# Patient Record
Sex: FEMALE | Race: WHITE
[De-identification: names, ages, dates, MRNs, and addresses within clinical notes are randomized per-mention and may not be internally consistent; named-entity substitution may affect disease eponyms.]

---

## 2018-02-10 ENCOUNTER — HOSPITAL ENCOUNTER (INPATIENT)
Dept: HOSPITAL 36 - ER | Age: 73
LOS: 4 days | Discharge: HOME | DRG: 640 | End: 2018-02-14
Attending: FAMILY MEDICINE | Admitting: FAMILY MEDICINE
Payer: MEDICARE

## 2018-02-10 DIAGNOSIS — E86.0: ICD-10-CM

## 2018-02-10 DIAGNOSIS — F31.9: ICD-10-CM

## 2018-02-10 DIAGNOSIS — Z91.041: ICD-10-CM

## 2018-02-10 DIAGNOSIS — Z83.79: ICD-10-CM

## 2018-02-10 DIAGNOSIS — G20: ICD-10-CM

## 2018-02-10 DIAGNOSIS — E83.42: ICD-10-CM

## 2018-02-10 DIAGNOSIS — R53.2: ICD-10-CM

## 2018-02-10 DIAGNOSIS — I12.9: ICD-10-CM

## 2018-02-10 DIAGNOSIS — F02.80: ICD-10-CM

## 2018-02-10 DIAGNOSIS — G25.0: ICD-10-CM

## 2018-02-10 DIAGNOSIS — Z88.7: ICD-10-CM

## 2018-02-10 DIAGNOSIS — K21.9: ICD-10-CM

## 2018-02-10 DIAGNOSIS — N18.9: ICD-10-CM

## 2018-02-10 DIAGNOSIS — G93.41: ICD-10-CM

## 2018-02-10 DIAGNOSIS — Z88.6: ICD-10-CM

## 2018-02-10 DIAGNOSIS — G62.9: ICD-10-CM

## 2018-02-10 DIAGNOSIS — E46: ICD-10-CM

## 2018-02-10 DIAGNOSIS — I25.10: ICD-10-CM

## 2018-02-10 DIAGNOSIS — Z87.891: ICD-10-CM

## 2018-02-10 DIAGNOSIS — E78.5: ICD-10-CM

## 2018-02-10 DIAGNOSIS — E87.1: Primary | ICD-10-CM

## 2018-02-10 DIAGNOSIS — J44.9: ICD-10-CM

## 2018-02-10 DIAGNOSIS — F41.9: ICD-10-CM

## 2018-02-10 DIAGNOSIS — R13.10: ICD-10-CM

## 2018-02-10 DIAGNOSIS — E87.6: ICD-10-CM

## 2018-02-10 LAB
ALBUMIN SERPL-MCNC: 4.2 GM/DL (ref 3.7–5.3)
ALBUMIN/GLOB SERPL: 1.4 {RATIO} (ref 1–1.8)
ALP SERPL-CCNC: 57 U/L (ref 34–104)
ALT SERPL-CCNC: 11 U/L (ref 7–52)
ANION GAP SERPL CALC-SCNC: 9.7 MMOL/L (ref 7–16)
APPEARANCE UR: CLEAR
AST SERPL-CCNC: 24 U/L (ref 13–39)
BACTERIA #/AREA URNS HPF: (no result) /HPF
BASOPHILS # BLD AUTO: 0.1 TH/CUMM (ref 0–0.2)
BASOPHILS NFR BLD AUTO: 1.1 % (ref 0–2)
BILIRUB SERPL-MCNC: 0.5 MG/DL (ref 0.3–1)
BILIRUB UR-MCNC: NEGATIVE MG/DL
BUN SERPL-MCNC: 27 MG/DL (ref 7–25)
CALCIUM SERPL-MCNC: 10.4 MG/DL (ref 8.6–10.3)
CHLORIDE SERPL-SCNC: 99 MEQ/L (ref 98–107)
CO2 SERPL-SCNC: 31.2 MEQ/L (ref 21–31)
COLOR UR: YELLOW
CREAT SERPL-MCNC: 0.8 MG/DL (ref 0.6–1.2)
EOSINOPHIL # BLD AUTO: 0.3 TH/CMM (ref 0.1–0.4)
EOSINOPHIL NFR BLD AUTO: 4.3 % (ref 0–5)
EPI CELLS URNS QL MICRO: (no result) /LPF
ERYTHROCYTE [DISTWIDTH] IN BLOOD BY AUTOMATED COUNT: 12.8 % (ref 11.5–20)
GLOBULIN SER-MCNC: 3 GM/DL
GLUCOSE SERPL-MCNC: 105 MG/DL (ref 70–105)
GLUCOSE UR STRIP-MCNC: 100 MG/DL
HCT VFR BLD CALC: 41.3 % (ref 41–60)
HGB BLD-MCNC: 13.8 GM/DL (ref 12–16)
KETONES UR STRIP-MCNC: NEGATIVE MG/DL
LEUKOCYTE ESTERASE UR-ACNC: NEGATIVE
LYMPHOCYTE AB SER FC-ACNC: 1 TH/CMM (ref 1.5–3)
LYMPHOCYTES NFR BLD AUTO: 13.2 % (ref 20–50)
MCH RBC QN AUTO: 30.5 PG (ref 27–31)
MCHC RBC AUTO-ENTMCNC: 33.4 PG (ref 28–36)
MCV RBC AUTO: 91.4 FL (ref 81–100)
MICRO URNS: YES
MONOCYTES # BLD AUTO: 0.6 TH/CMM (ref 0.3–1)
MONOCYTES NFR BLD AUTO: 7.7 % (ref 2–10)
NEUTROPHILS # BLD: 5.5 TH/CMM (ref 1.8–8)
NEUTROPHILS NFR BLD AUTO: 73.7 % (ref 40–80)
NITRITE UR QL STRIP: NEGATIVE
PH UR STRIP: 7 [PH] (ref 4.6–8)
PLATELET # BLD: 350 TH/CMM (ref 150–400)
PMV BLD AUTO: 8 FL
POTASSIUM SERPL-SCNC: 4.9 MEQ/L (ref 3.5–5.1)
PROT UR STRIP-MCNC: NEGATIVE MG/DL
RBC # BLD AUTO: 4.52 MIL/CMM (ref 3.8–5.2)
RBC # UR STRIP: NEGATIVE /UL
RBC #/AREA URNS HPF: (no result) /HPF (ref 0–5)
SODIUM SERPL-SCNC: 135 MEQ/L (ref 136–145)
SP GR UR STRIP: 1.01 (ref 1–1.03)
URINALYSIS COMPLETE PNL UR: (no result)
UROBILINOGEN UR STRIP-ACNC: 0.2 E.U./DL (ref 0.2–1)
WBC # BLD AUTO: 7.5 TH/CMM (ref 4.8–10.8)
WBC #/AREA URNS HPF: (no result) /HPF (ref 0–5)

## 2018-02-10 PROCEDURE — X3401: HCPCS

## 2018-02-10 PROCEDURE — Z7610: HCPCS

## 2018-02-10 RX ADMIN — Medication SCH TAB: at 21:13

## 2018-02-10 RX ADMIN — DEXTROSE AND SODIUM CHLORIDE SCH MLS/HR: 5; .9 INJECTION, SOLUTION INTRAVENOUS at 21:15

## 2018-02-10 NOTE — ED PHYSICIAN CHART
ED Chief Complaint/HPI





- Patient Information


Date Seen:: 02/10/18


Time Seen:: 14:15


Chief Complaint:: GENERALIZED WEAKNESS and NOT EATING X 4 WKS


History of Present Illness:: 





HPI was provided by the patient's sister.  Patient has had difficulty walking 

over the past 4 years.  3 weeks ago was noted that she was unable to transfer 

from her wheelchair to the toilet and in and out of bed without assistance.  

The patient has difficulty swallowing and has not been eating for the past 3-4 

weeks.  Occasionally she chokes and has had infrequent episodes of vomiting.  

The patient denies any pain, nausea, difficulty breathing, fever, chills, or 

diaphoresis.  She has sustained a 10 pound weight loss over the past month.  

The patient's has Parkinson's disease and her psychiatrist was considering 

bringing her into the hospital for evaluation of her medications.  The patient'

s sister has noticed over the past month that her tongue protrudes from her 

mouth in a way that it has never done so before.


Allergies:: 


 Allergies











Allergy/AdvReac Type Severity Reaction Status Date / Time


 


blue dye Allergy   Verified 02/10/18 14:06


 


tramadol Allergy   Verified 02/10/18 14:06


 


IV contrast Allergy   Uncoded 02/10/18 14:06











Vitals:: 


 Vital Signs - 8 hr











  02/10/18





  14:06


 


Temp 98.0 F


 


HR 77


 


RR 18


 


/92


 


O2 Sat % 96











Historian:: Family Member





ED Review of Systems





- Review of Systems


General/Constitutional: No fever, No chills, Weakness, No diaphoresis, No edema

, Loss of appetite


Skin: No skin lesions, No rash, Bruising


Head: No headache, No light-headedness


Eyes: No loss of vision, No diplopia


ENT: No earache, No sore throat, No tinnitus


Neck: No neck pain, No swelling, No thyromegaly, Stiffness, No mass noted


Cardio Vascular: No chest pain, No orthopnea, No edema


Pulmonary: No SOB, No cough, No sputum, No wheezing


GI: No nausea (occasional vomiting accompanying choking episodes.), No diarrhea

, No hematochezia, No constipation, No hematemesis, Other (difficulty swallowing

)


G/U: No dysuria, No frequency, No hematuria


Ob/Gyn: No abnormal vaginal bleed


Musculoskeletal: No bone or joint pain, Back pain (back pain is chronic.)


Endocrine: No polyuria, No polydipsia


Psychiatric: Other (patient has a diagnosis of dementia.  She also has Parkinson

's disease.)


Hematopoietic: Bruising


Allergic/Immuno: No urticaria, No angioedema


Neurological: No syncope, Weakness, No paresthesia, No headache, No seizure, No 

dizziness, Other (patient has a prominent tremor in the left upper extremity.  

 is bilaterally weak.)





ED Past Medical History





- Past Medical History


Past Medical History: Dementia, Other (Parkinson's disease.  The patient smoked 

in the past but is currently not using any tobacco products.)


Social History: No Drug Use, Care Facility, Other (the patient has in the past 

had addiction to prescription opiates.)


Surgical History: 





Family Medical History





- Family Member


  ** Mother


History Unknown: Yes


Ethnicity: Non-


Living Status: 


Hx Family Cancer:  (Unknown)


Hx Family Congestive Heart Failure:  (Unknown)


Hx Family Hypertension:  (Unknown)


Hx Family Stroke:  (Unknown)


Hx Family Diabetes:  (Unknown)


Hx Family Seizures:  (Unknown)


Hx Family Dementia:  (Unknown)


Hx Family AIDS:  (Unknown)


Hx Family COPD:  (Unknown)


Hx Family Hepatitis:  (Unknown)


Hx Family Tuberculosis:  (Unknown)


Other Medical History: Diverticulosis





ED Physical Exam





- Physical Examination


General/Constitutional: Well-developed, well-nourished, Non-toxic appearing


Other Gen/Cons comments:: 





The patient is awake but not alert.  She has a very soft voice she is oriented 

3 to her name, the fact that she is in a hospital and she is able to identify 

the year as 2018.  Patient has a noticeable tremor in the left upper extremity 

and extremely stiff neck with a frozen dinner in the AP flexion position.  The 

patient no longer smokes or uses opiate medications.  She does not drink 

alcohol.  She was  prior to her  expiring.  She has 2 living 

children one of which suffers from MS and mental cognition.


Head: Atraumatic


Eyes: PERRL, EOMI


Other Eyes comments:: 





Patient has a discharge from her right eye which the sister has noticed is new 

over the past week.  No facial asymmetry.  Tongue protrudes in the midline.


Skin: Nl inspection, No rash, Well hydrated, No lymphadenopathy


Other Skin comments:: 





Patient has superficial ecchymotic discoloration in both upper extremities.


ENMT: External ears, nose nl


Other ENMT comments:: 





Unable to evaluate the posterior pharynx due to a large protruding tongue.  

Adequate oral hydration.


Neck: Nontender, No bruit, No mass, No stridor


Other Neck comments:: 





The range of motion is markedly limited in the neck due to stiffness.


Respiratory: Nl effort/Exclusion, Clear to Auscultation, No Wheeze/Rhonchi/Rales


Cardio Vascular: RRR, No murmur, gallop, rubs, NL S1 S2


Other Cardio Vascular comments:: 





Adequate pulses in all 4 extremities.


: No CVA tenderness, No discharge


Extremities: No tenderness or effusion, Full ROM, No edema


Other Extremities comments:: 





Diffuse weakness in both upper and lower extremities.


Misc: No paraspinal tenderness


Other Misc comments:: 





No spinal tenderness to palpation.





ED Labs/Radiology/EKG Results





- Lab Results


Results: 





2 views of the AP chest x-ray were obtained due to significant rotation 

secondary to Parkinson's disease.  There was no cardiomegaly and no CHF.  No 

areas of pulmonary consolidation or infiltrate.  No pneumothorax.  Impression: 

No acute cardiopulmonary findings.  This x-ray was read by me prior to 

reporting from radiology.


 Laboratory Tests











  02/10/18





  15:13


 


WBC  7.5


 


RBC  4.52


 


Hgb  13.8


 


Hct  41.3


 


MCV  91.4


 


MCH  30.5


 


MCHC Differential  33.4


 


RDW  12.8


 


Plt Count  350


 


MPV  8.0


 


Neutrophils %  73.7


 


Lymphocytes %  13.2 L


 


Monocytes %  7.7


 


Eosinophils %  4.3


 


Basophils %  1.1








Laboratory evaluation: ABC is unremarkable and that there is no leukocytosis or 

anemia.  Platelet function is within the normal range.


 Laboratory Tests











  02/10/18 02/10/18





  15:13 15:13


 


WBC  7.5 


 


RBC  4.52 


 


Hgb  13.8 


 


Hct  41.3 


 


MCV  91.4 


 


MCH  30.5 


 


MCHC Differential  33.4 


 


RDW  12.8 


 


Plt Count  350 


 


MPV  8.0 


 


Neutrophils %  73.7 


 


Lymphocytes %  13.2 L 


 


Monocytes %  7.7 


 


Eosinophils %  4.3 


 


Basophils %  1.1 


 


Sodium   135 L


 


Potassium   4.9


 


Chloride   99


 


Carbon Dioxide   31.2 H


 


Anion Gap   9.7


 


BUN   27 H


 


Creatinine   0.8


 


Est GFR ( Amer)   > 60.0


 


Est GFR (Non-Af Amer)   > 60.0


 


BUN/Creatinine Ratio   33.8


 


Glucose   105


 


Calcium   10.4 H


 


Total Bilirubin   0.5


 


AST   24


 


ALT   11


 


Alkaline Phosphatase   57


 


Total Protein   7.2


 


Albumin   4.2


 


Globulin   3.0


 


Albumin/Globulin Ratio   1.4








The CBC was unremarkable with no leukocytosis, anemia or thrombocytopenia.  

Metabolic studies showed a mild hyponatremia with a sodium of 135 and a high 

bicarbonate of 31.2.  Serum creatinine was within normal parameters and the BUN 

was mildly elevated at 27.  Her serum glucose was 105 which is minimally 

elevated.  Liver function tests including the AST, the ALT and the alkaline 

phosphatase were all within normal parameters.  Urinalysis is still pending at 

this time.





- EKG Interpretations


EKG Time:: 14:39


Rate & Rhythm: the patient is in normal sinus rhythm at a rate of 70 with no 

ectopy.


Axis: the QRS axis is normal.


Intervals: the NC interval is within normal parameters.  The QRS duration is 

normal.  


Comments:: 





There is early transition of the QRS in the precordial leads.  No ST segment 

elevation or depression.  Impression no acute ischemic findings.  Borderline 

EKG.





ED Assessment





- Assessment


General Assessment: 





CASE SUMMARY:  THE PT WAS BIB HER SISTER WHO PROVIDED THE HPI.  SHE HAD BEEN 

HAVING PROBLEMS SWALLOWING AND HAD LOST 10 LBS IN THE PAST WEEK.  SHE HAS HAD 

PARKINSON'S DISEASE AND HAD BECOME BED RIDDEN OR CONFINED TO HER WILL CHAIR 

OVER THE PAST 3 YEARS.  SHE HAS BEEN UNABLE TO TRANSFER FROM HER WHEEL CHAIR TO 

HER BED OR TOIETWITHOUT ASSISTANCE.  SHE HAS ALSO BEEN GETTING SLOWLY MORE 

CONFUSED AND GIVEN A DIAGNOSIS OF DEMENTIA.  SHE HAS HAD PARKINSON'S DISEASE 

FOR THE PAST 6 YEARS WITH STIFFNESS IN HER NECK AND BOTH LOWER EXTREMITIES WITH 

WEAKNESS IN BOTH UPPER AND LOWER EXTREMITIES.  PT DENIES ANY PAIN CURRENTLY BUT 

IN THE PAST WAS HAVING PROBLEMS WITH OXYCONTIN THAT SHE TOOK FOR CHRONIC BACK 

PAIN.  HER BLOOD  WORK WAS UNREMARKABLE WITH NORMAL HEMOGLOBIN LEVEL, NORMAL 

WHITE COUNT, AND NORMAL PLATELET COUNT. HER ELECTROLYTES WERE WITHIN NORMAL 

PARAMETERS AND THERE WAS NO EVIDENCE OF RENAL IMPAIRMENT OR HEPATIC IMPAIRMENT. 

CHEST X-RAY SHOWED NO EVIDENCE FOR ACUTE PNEUMONIA OR CHF. THE URINALYSIS WAS 

NEGATIVE FOR UTI. DURING THE COURSE OF THE ER OBSERVATION, THE PATIENT'S SISTER 

RECOMMENDED THAT WE TRY GIVING HER SOME ATIVAN TO HELP WITH HER TREMOR. 

SURPRISINGLY, AFTER RECEIVING THE  ATIVAN THE PATIENT BECAME MUCH MORE 

EXPRESSIVE AND AWAKE AND LOOKED 100% BETTER. THE CASE WAS DISCUSSED WITH DR. HAYNES  AND THE DECISION WAS MADE TO ADMIT HER FOR FURTHER STUDY OF 

ESOPHAGEAL MOVEMENT AND IMPROVEMENT IN HER NUTRITION. ADMITTED INTO IMPROVED 

CONDITION.





MDM DDX FOR INCREASED WEAKNESS:  NOT  ANEMIA BASED ON THE CBC RESULTS.  NOT 

ELECTROLYTE IMBALANCE BASED ON LABORATORY STUDIES.  NOT  SEPSIS BASED ON THE 

PATIENT'S VITAL SIGNS, LABORATORY STUDIES AND PROTRACTED ONSET OF SYMPTOMS.  NOT

  MYOCARDIAL ISCHEMIA BASED ON THE PATIENT'S HISTORY AND EKG.  NOT   CVA BASED 

ON THE PATIENT'S HISTORY AND LACK OF FOCAL NEUROLOGIC DEFICIT.





ED Septic Shock





- .


Is Septic Shock (SBP<90, OR Lactate>4 mmol\L) present?: No





- <6hrs of presentation:


Vital Signs: 


 Vital Signs - 8 hr











  02/10/18





  14:06


 


Temp 98.0 F


 


HR 77


 


RR 18


 


/92


 


O2 Sat % 96














ED Reassessment (Disposition)





- Reassessment


Reassessment Condition:: Improved





- Diagnosis


Diagnosis:: 





 PARKINSON'S DISEASE, DEMENTIA, HYPERTENSION, AND MILD  MILD  HYPONATREMIA.





- Aftercare/Follow up Instructions


Aftercare/Follow-Up Instructions:: Counseled pt regarding lab results/diagnosis 

& need follow up, Counseled pt & family regarding lab results/diagnosis & need 

follow up





- Patient Disposition


Discharge/Transfer:: Acute Care w/in this hosp


Accepting Physician:: DR. HAYNES





ED Discharge Plan





- Patient Disposition


Admit/Discharge/Transfer: Acute Care w/in this hosp

## 2018-02-11 LAB
ALBUMIN SERPL-MCNC: 3.8 GM/DL (ref 3.7–5.3)
ALBUMIN/GLOB SERPL: 1.5 {RATIO} (ref 1–1.8)
ALP SERPL-CCNC: 49 U/L (ref 34–104)
ALT SERPL-CCNC: 6 U/L (ref 7–52)
ANION GAP SERPL CALC-SCNC: 9.7 MMOL/L (ref 7–16)
AST SERPL-CCNC: 21 U/L (ref 13–39)
BASOPHILS # BLD AUTO: 0 TH/CUMM (ref 0–0.2)
BASOPHILS NFR BLD AUTO: 0 % (ref 0–2)
BILIRUB SERPL-MCNC: 0.6 MG/DL (ref 0.3–1)
BUN SERPL-MCNC: 18 MG/DL (ref 7–25)
CALCIUM SERPL-MCNC: 9.7 MG/DL (ref 8.6–10.3)
CHLORIDE SERPL-SCNC: 102 MEQ/L (ref 98–107)
CO2 SERPL-SCNC: 25.9 MEQ/L (ref 21–31)
CREAT SERPL-MCNC: 0.6 MG/DL (ref 0.6–1.2)
EOSINOPHIL # BLD AUTO: 0.7 TH/CMM (ref 0.1–0.4)
EOSINOPHIL NFR BLD AUTO: 10.3 % (ref 0–5)
ERYTHROCYTE [DISTWIDTH] IN BLOOD BY AUTOMATED COUNT: 12.3 % (ref 11.5–20)
GLOBULIN SER-MCNC: 2.5 GM/DL
GLUCOSE SERPL-MCNC: 97 MG/DL (ref 70–105)
HCT VFR BLD CALC: 37.9 % (ref 41–60)
HGB BLD-MCNC: 13 GM/DL (ref 12–16)
LYMPHOCYTE AB SER FC-ACNC: 1.2 TH/CMM (ref 1.5–3)
LYMPHOCYTES NFR BLD AUTO: 18.3 % (ref 20–50)
MCH RBC QN AUTO: 31.1 PG (ref 27–31)
MCHC RBC AUTO-ENTMCNC: 34.3 PG (ref 28–36)
MCV RBC AUTO: 90.9 FL (ref 81–100)
MONOCYTES # BLD AUTO: 0.7 TH/CMM (ref 0.3–1)
MONOCYTES NFR BLD AUTO: 10.6 % (ref 2–10)
NEUTROPHILS # BLD: 3.9 TH/CMM (ref 1.8–8)
NEUTROPHILS NFR BLD AUTO: 60.8 % (ref 40–80)
PLATELET # BLD: 285 TH/CMM (ref 150–400)
PMV BLD AUTO: 8.3 FL
POTASSIUM SERPL-SCNC: 3.6 MEQ/L (ref 3.5–5.1)
RBC # BLD AUTO: 4.17 MIL/CMM (ref 3.8–5.2)
SODIUM SERPL-SCNC: 134 MEQ/L (ref 136–145)
WBC # BLD AUTO: 6.5 TH/CMM (ref 4.8–10.8)

## 2018-02-11 RX ADMIN — DEXTROSE AND SODIUM CHLORIDE SCH MLS/HR: 5; .9 INJECTION, SOLUTION INTRAVENOUS at 16:05

## 2018-02-11 RX ADMIN — Medication SCH TAB: at 10:45

## 2018-02-11 RX ADMIN — NIFEDIPINE SCH MG: 30 TABLET, FILM COATED, EXTENDED RELEASE ORAL at 10:45

## 2018-02-11 NOTE — DIAGNOSTIC IMAGING REPORT
Portable chest x-ray



HISTORY: Weight loss



The heart appears to be enlarged.  There is a faint linear density

within the right perihilar region.  The finding may be associated with

scarring or subsegmental atelectasis.  No other focal processes.  No

hilar or mediastinal abnormalities.



IMPRESSION:

1.  Faint linear density within the right midlung that may be related to

scarring or subsegmental atelectasis

2.  Cardiomegaly

## 2018-02-11 NOTE — HISTORY AND PHYSICAL
History of Present Illness





- HPI


Chief Complaint: 





generalized weakness and inability to eat x 4 weeks.


HPI: 





72 year old female who presents to [facility] for difficulty 

swallowing for the past 4 weeks and 10 lbs weight loss noted at the nursing 

facility. Patient has a previous history of Parkinson's disease and over the 

course of 4 years has progressed to a pont where she is unable to transfer from 

her wheelchair to the toilet and has needed assistance in and out of bed. 

Recently patient has been noted to have difficulty swallowing and losing 

weight. Patient presented with dehydration and hyponatremia. Patient has been 

noted to have increased tremors to the left upper ext.





Initial labs





WBC 7.5 H/H 13.8/41.3 platelets 350


Na 135 K 4.9 Bun 27 cr 0.8 glu 105


AST 24 ALT 11 Alk 57





Vital Signs: 





 Last Vital Signs











Temp  97.6 F   18 04:00


 


Pulse  62   18 04:00


 


Resp  18   18 04:00


 


BP  140/76   18 04:00


 


Pulse Ox  98   18 04:00














Past Medical History


Cardiovascular: Report: CAD, Hyperlipidemia


Pulmonary: Report: COPD


CNS: Report: Dementia, Other (Parkinson's disease, unstable gait, polyneuropathy

, essential tremors)


GI: Report: GERD, Other (dysphagia)


Psych: Report: Anxiety


Musculoskeletal: Report: No Pertinent Hx


Rheumatologic: Report: No pertinent Hx


Infectious Disease: Report: No Pertinent Hx


Renal/: Report: Chronic Renal Insuff


Endocrine: Report: No Pertinent Hx


Dermatology: Report: No Pertinent Hx





- Past Surgical History


Past Surgical History: No pertinent Hx





Family Medical History





- Family Member


  ** Mother


History Unknown: Yes


Ethnicity: Non-


Living Status: 


Hx Family Cancer:  (Unknown)


Hx Family Congestive Heart Failure:  (Unknown)


Hx Family Hypertension:  (Unknown)


Hx Family Stroke:  (Unknown)


Hx Family Diabetes:  (Unknown)


Hx Family Seizures:  (Unknown)


Hx Family Dementia:  (Unknown)


Hx Family AIDS:  (Unknown)


Hx Family COPD:  (Unknown)


Hx Family Hepatitis:  (Unknown)


Hx Family Tuberculosis:  (Unknown)


Other Medical History: Diverticulosis





Social History


Smoke: No


Alcohol: None


Drugs: None


Lives: Alone





- Medications


Home Medications: 


Home Medication











 Medication  Instructions  Recorded  Type


 


Al Hyd/Mg Hyd/Simethicone [Maalox] 30 ml PO Q4HR PRN 12/08/15 History


 


Aspirin EC [Ecotrin] 81 mg PO DAILY 12/08/15 History


 


Atenolol [Tenormin*] 25 mg PO DAILY 12/08/15 History


 


Docusate Sodium [Colace] 200 mg PO BID 12/08/15 History


 


Enalapril Maleate [Vasotec*] 20 mg PO DAILY 12/08/15 History


 


Lorazepam [Ativan] 1 mg PO Q6HR PRN 12/08/15 History


 


Magnesium Hydroxide [Milk of 30 ml PO HS PRN 12/08/15 History





Magnesia]   


 


Meloxicam [Mobic*] 15 mg PO DAILY 12/08/15 History


 


Multivit,Th Iron,Other Min 1 tab PO DAILY 12/08/15 History





[Thera-M]   


 


Nifedipine [Procardia Xl*] 30 mg PO DAILY 12/08/15 History


 


Rotigotine [Neupro] 4 mg TD Q24H 12/20/15 History


 


Carbidopa/Levodopa 25/250 mg 1 tab PO 5XD 02/10/18 History





[Sinemet 25 mg-250 mg]   


 


buPROPion [Wellbutrin] 75 mg PO DAILY 02/10/18 History














- Allergies


Allergies/Adverse Reactions: 


 Allergies











Allergy/AdvReac Type Severity Reaction Status Date / Time


 


blue dye Allergy   Verified 02/10/18 14:06


 


tramadol Allergy   Verified 02/10/18 14:06


 


IV contrast Allergy   Uncoded 02/10/18 14:06














Review of Systems





- Review of Systems


Constitutional: Report: No Significant


Eyes: Report: No Significant


ENT: Report: No Significant


Respiratory: Report: No Significant


Cardiovascular: Report: No Significant


Gastrointestinal: Report: Other (dysphagia)


Genitourinary: Report: No Significant


Neurological: Report: Other (dementia)





Physical Exam





- Physical Exam


HEENT: Report: Ears Nose Throat within normal limits, Pharnyx within normal 

limits


Neck: Report: Within normal limits


Cardiovascular Systems: Report: +s1/s2 noted, Regular, Rate and Rhythm


Respiratory: Report: Breath Sounds are within normal limits, Clear to 

Auscultation of lung fields


Abdomen: Report: Non-tender to palpation


Extremities: Report: Non-tender to palpation.


Skin: Report: Color of skin is within normal limits


Neuro/Psych: Report: Mood affect is within normal limits, A+Ox3





- Lab Results


All Lab Results last 24 hours: 





 Laboratory Results - last 24 hr











  02/10/18





  18:13


 


POC Glucose  92














- Assessment


Assessment: 





generalized weakness


10 lbs weight loss


dehydration


hyponatremia


Parkinson's disease


COPD


CAD


hyperlipidemia


Anxiety


Depression


Bipolar disorder


Essential tremors








- Plan


Plan: 





Swallow eval


IV fluids


GI consult


Neurology consult


Psychiatry consult


repeat CBC,CMP


CEA

## 2018-02-12 LAB
ALBUMIN SERPL-MCNC: 3.7 GM/DL (ref 3.7–5.3)
ALBUMIN/GLOB SERPL: 1.5 {RATIO} (ref 1–1.8)
ALP SERPL-CCNC: 46 U/L (ref 34–104)
ALT SERPL-CCNC: < 3 U/L (ref 7–52)
ANION GAP SERPL CALC-SCNC: 9 MMOL/L (ref 7–16)
AST SERPL-CCNC: 19 U/L (ref 13–39)
BASOPHILS # BLD AUTO: 0 TH/CUMM (ref 0–0.2)
BASOPHILS NFR BLD AUTO: 0 % (ref 0–2)
BILIRUB SERPL-MCNC: 0.5 MG/DL (ref 0.3–1)
BUN SERPL-MCNC: 11 MG/DL (ref 7–25)
CALCIUM SERPL-MCNC: 9.5 MG/DL (ref 8.6–10.3)
CHLORIDE SERPL-SCNC: 101 MEQ/L (ref 98–107)
CO2 SERPL-SCNC: 25.2 MEQ/L (ref 21–31)
CREAT SERPL-MCNC: 0.7 MG/DL (ref 0.6–1.2)
EOSINOPHIL # BLD AUTO: 0.6 TH/CMM (ref 0.1–0.4)
EOSINOPHIL NFR BLD AUTO: 8.8 % (ref 0–5)
ERYTHROCYTE [DISTWIDTH] IN BLOOD BY AUTOMATED COUNT: 12.4 % (ref 11.5–20)
GLOBULIN SER-MCNC: 2.5 GM/DL
GLUCOSE SERPL-MCNC: 104 MG/DL (ref 70–105)
HCT VFR BLD CALC: 39.4 % (ref 41–60)
HGB BLD-MCNC: 13 GM/DL (ref 12–16)
LYMPHOCYTE AB SER FC-ACNC: 1 TH/CMM (ref 1.5–3)
LYMPHOCYTES NFR BLD AUTO: 15.4 % (ref 20–50)
MCH RBC QN AUTO: 30.6 PG (ref 27–31)
MCHC RBC AUTO-ENTMCNC: 33.1 PG (ref 28–36)
MCV RBC AUTO: 92.5 FL (ref 81–100)
MONOCYTES # BLD AUTO: 0.6 TH/CMM (ref 0.3–1)
MONOCYTES NFR BLD AUTO: 9.5 % (ref 2–10)
NEUTROPHILS # BLD: 4.1 TH/CMM (ref 1.8–8)
NEUTROPHILS NFR BLD AUTO: 66.3 % (ref 40–80)
PLATELET # BLD: 295 TH/CMM (ref 150–400)
PMV BLD AUTO: 7.9 FL
POTASSIUM SERPL-SCNC: 3.2 MEQ/L (ref 3.5–5.1)
RBC # BLD AUTO: 4.27 MIL/CMM (ref 3.8–5.2)
SODIUM SERPL-SCNC: 132 MEQ/L (ref 136–145)
WBC # BLD AUTO: 6.3 TH/CMM (ref 4.8–10.8)

## 2018-02-12 RX ADMIN — NIFEDIPINE SCH MG: 30 TABLET, FILM COATED, EXTENDED RELEASE ORAL at 09:26

## 2018-02-12 RX ADMIN — Medication SCH TAB: at 09:24

## 2018-02-12 NOTE — CONSULTATION
DATE OF CONSULTATION:  02/11/2018



REASON FOR CONSULTATION:  Decreased oral intake and possible dysphagia.



HISTORY OF PRESENT ILLNESS:  This consult was obtained through the request of

Dr. Kent for this 72-year-old with multiple medical problems including

coronary artery disease, COPD, hyperlipidemia, dementia, Parkinson disease,

tremors, polyneuropathy, and GERD; admitted to the hospital for decreased oral

intake and refusing to eat for the last 4 weeks accompanied by weight loss.



PAST MEDICAL HISTORY:  Parkinson disease, now unable to ambulate; coronary

artery disease; COPD; hyperlipidemia; and dementia.



PAST SURGICAL HISTORY:  Not known.



SOCIAL HISTORY:  Nonsmoker, nonalcoholic, non-IV drug abuser.



FAMILY HISTORY:  Noncontributory, unobtainable.



REVIEW OF SYSTEMS:  Unreliable.



MEDICATIONS:  The patient is on Maalox, Ecotrin, Tenormin, Colace, Vasotec,

Ativan, milk of magnesia, Mobic, multivitamin, Procardia, Sinemet, and

Wellbutrin.



ALLERGIES:  TRAMADOL.



PHYSICAL EXAMINATION:

GENERAL:  The patient is awake, oriented to self, responsive.

VITAL SIGNS:  Blood pressure is 121/72, heart rate 76, respiratory rate 18, and

temperature is 97.7.

HEAD AND NECK:  Pupils reactive to light.  Extraocular muscles could not be

tested.  Oral cavity, no lesion.

NECK:  Supple.

CHEST:  Good air entry.

LUNGS:  Clear to auscultation.

CARDIOVASCULAR:  Regular rate and rhythm.  No murmur or gallop.

ABDOMEN:  Soft, obese, positive bowel sounds.  Abdomen was not tender.

EXTREMITIES:  Lower extremities, no edema.

CENTRAL NERVOUS SYSTEM:  Grossly nonfocal.



LABORATORY DATA:  CBC unremarkable.  Liver enzymes are normal.



IMPRESSION:  A 72-year-old with decreased oral intake.



ASSESSMENT AND PLAN:  Decreased oral intake, this could be related to Parkinson

disease, could be due to decreased appetite, could be due to difficulty

swallowing.  We will do a swallowing evaluation.  We will start the patient on

Remeron.  We will provide nutritional supplement.  If the patient fails

swallowing evaluation, ____ PEG.  Meanwhile, continue supportive care.



Other medical problems such as dementia, Parkinson disease, hyperlipidemia,

etc., as per Dr. Kent.



Thank you, Dr. Kent for allowing me to participate in the care of the

patient.  If you have any further questions, please let me know.





DD: 02/11/2018 20:14

DT: 02/12/2018 01:26

[facility]# [id number]  [id number]

## 2018-02-12 NOTE — GENERAL PROGRESS NOTE
Subjective





- Review of Systems


Service Date: 02/12/18


Subjective: 





Patient resting comfortably in bed. For Swallow evaluation today. continue IV 

fluids. NPO except meds. 





Objective





- Results


Result Diagrams: 


 02/11/18 06:06





 02/11/18 06:06


Recent Labs: 


 Laboratory Last Values











WBC  6.5 Th/cmm (4.8-10.8)   02/11/18  06:06    


 


RBC  4.17 Mil/cmm (3.80-5.20)   02/11/18  06:06    


 


Hgb  13.0 gm/dL (12-16)   02/11/18  06:06    


 


Hct  37.9 % (41.0-60)  L  02/11/18  06:06    


 


MCV  90.9 fl ()   02/11/18  06:06    


 


MCH  31.1 pg (27.0-31.0)  H  02/11/18  06:06    


 


MCHC Differential  34.3 pg (28.0-36.0)   02/11/18  06:06    


 


RDW  12.3 % (11.5-20.0)   02/11/18  06:06    


 


Plt Count  285 Th/cmm (150-400)   02/11/18  06:06    


 


MPV  8.3 fl  02/11/18  06:06    


 


Neutrophils %  60.8 % (40.0-80.0)   02/11/18  06:06    


 


Lymphocytes %  18.3 % (20.0-50.0)  L  02/11/18  06:06    


 


Monocytes %  10.6 % (2.0-10.0)  H  02/11/18  06:06    


 


Eosinophils %  10.3 % (0.0-5.0)  H  02/11/18  06:06    


 


Basophils %  0.0 % (0.0-2.0)   02/11/18  06:06    


 


Sodium  134 mEq/L (136-145)  L  02/11/18  06:06    


 


Potassium  3.6 mEq/L (3.5-5.1)   02/11/18  06:06    


 


Chloride  102 mEq/L ()   02/11/18  06:06    


 


Carbon Dioxide  25.9 mEq/L (21.0-31.0)   02/11/18  06:06    


 


Anion Gap  9.7  (7.0-16.0)   02/11/18  06:06    


 


BUN  18 mg/dL (7-25)   02/11/18  06:06    


 


Creatinine  0.6 mg/dL (0.6-1.2)   02/11/18  06:06    


 


Est GFR ( Amer)  > 60.0 ml/min (>90)   02/11/18  06:06    


 


Est GFR (Non-Af Amer)  > 60.0 ml/min  02/11/18  06:06    


 


BUN/Creatinine Ratio  30.0   02/11/18  06:06    


 


Glucose  97 mg/dL ()   02/11/18  06:06    


 


POC Glucose  92 MG/DL (70 - 105)   02/10/18  18:13    


 


Calcium  9.7 mg/dL (8.6-10.3)   02/11/18  06:06    


 


Total Bilirubin  0.6 mg/dL (0.3-1.0)   02/11/18  06:06    


 


AST  21 U/L (13-39)   02/11/18  06:06    


 


ALT  6 U/L (7-52)  L  02/11/18  06:06    


 


Alkaline Phosphatase  49 U/L ()   02/11/18  06:06    


 


Total Protein  6.3 gm/dL (6.0-8.3)   02/11/18  06:06    


 


Albumin  3.8 gm/dL (3.7-5.3)   02/11/18  06:06    


 


Globulin  2.5 gm/dL  02/11/18  06:06    


 


Albumin/Globulin Ratio  1.5  (1.0-1.8)   02/11/18  06:06    


 


Urine Source  RANDOM   02/10/18  15:56    


 


Urine Color  YELLOW   02/10/18  15:56    


 


Urine Clarity  CLEAR  (CLEAR)   02/10/18  15:56    


 


Urine pH  7.0  (4.6 - 8.0)   02/10/18  15:56    


 


Ur Specific Gravity  1.015  (1.005-1.030)   02/10/18  15:56    


 


Urine Protein  NEGATIVE mg/dL (NEGATIVE)   02/10/18  15:56    


 


Urine Glucose (UA)  100 mg/dL (NEGATIVE)  H  02/10/18  15:56    


 


Urine Ketones  NEGATIVE mg/dL (NEGATIVE)   02/10/18  15:56    


 


Urine Blood  NEGATIVE  (NEGATIVE)   02/10/18  15:56    


 


Urine Nitrate  NEGATIVE  (NEGATIVE)   02/10/18  15:56    


 


Urine Bilirubin  NEGATIVE  (NEGATIVE)   02/10/18  15:56    


 


Urine Urobilinogen  0.2 E.U./dL (0.2 - 1.0)   02/10/18  15:56    


 


Ur Leukocyte Esterase  NEGATIVE  (NEGATIVE)   02/10/18  15:56    


 


Urine RBC  NONE SEEN /hpf (0-5)   02/10/18  15:56    


 


Urine WBC  0-2 /hpf (0-5)   02/10/18  15:56    


 


Ur Epithelial Cells  OCCASIONAL /lpf (FEW)   02/10/18  15:56    


 


Urine Bacteria  FEW /hpf (NONE SEEN)   02/10/18  15:56    














- Physical Exam


Vitals and I&O: 


 Vital Signs











Temp  98.0 F   02/12/18 04:00


 


Pulse  57   02/12/18 04:00


 


Resp  18   02/12/18 04:00


 


BP  142/80   02/12/18 04:00


 


Pulse Ox  96   02/12/18 04:00








 Intake & Output











 02/11/18 02/12/18 02/12/18





 18:59 06:59 18:59


 


Intake Total 1241.667 100 


 


Balance 1241.667 100 


 


Weight (lbs) 74.389 kg 73.936 kg 


 


Intake:   


 


  Intake, IV Amount 941.667  


 


    D5-0.9%Ns 1,000 ml @ 50 941.667  





    mls/hr IV .Q20H [facility] Rx#:   





    [id number]   


 


  Oral 300 100 


 


Other:   


 


  # Voids 3 2 


 


  # Bowel Movements  0 











Active Medications: 


Current Medications





Al Hydrox/Mg Hydrox/Simethicone (Maalox)  30 ml PO Q4HR PRN


   PRN Reason: GI DISTRESS


   Stop: 04/11/18 18:30


Aspirin (Ecotrin)  81 mg PO DAILY [facility]


   Stop: 04/12/18 08:59


   Last Admin: 02/11/18 10:46 Dose:  81 mg


Atenolol (Tenormin)  25 mg PO DAILY [facility]


   Stop: 04/12/18 08:59


   Last Admin: 02/11/18 16:04 Dose:  25 mg


Bupropion HCl (Wellbutrin)  75 mg PO DAILY [facility]


   PRN Reason: Protocol


   Stop: 04/12/18 08:59


   Last Admin: 02/11/18 10:45 Dose:  75 mg


Carbidopa/Levodopa (Sinemet 25 Mg-250 Mg)  1 tab PO 5XD [facility]


   Stop: 04/11/18 21:59


   Last Admin: 02/12/18 06:57 Dose:  1 tab


Diclofenac Sodium (Voltaren)  25 mg PO BID [facility]


   Stop: 04/12/18 08:59


   Last Admin: 02/11/18 16:03 Dose:  25 mg


Docusate Sodium (Colace)  200 mg PO BID [facility]


   Stop: 04/11/18 19:29


   Last Admin: 02/11/18 16:03 Dose:  200 mg


Enalapril Maleate (Vasotec)  20 mg PO DAILY [facility]


   Stop: 04/12/18 08:59


   Last Admin: 02/11/18 10:46 Dose:  20 mg


Dextrose/Sodium Chloride (D5-0.9%Ns)  1,000 mls @ 50 mls/hr IV .Q20H [facility]


   Stop: 04/11/18 17:41


   Last Admin: 02/11/18 16:05 Dose:  50 mls/hr


Lorazepam (Ativan)  1 mg PO Q6HR PRN; Protocol


   PRN Reason: Anxiety


   Stop: 04/11/18 18:30


   Last Admin: 02/12/18 01:36 Dose:  1 mg


Magnesium Hydroxide (Milk Of Magnesia)  30 ml PO HS PRN


   PRN Reason: Constipation


   Stop: 04/11/18 18:30


Miscellaneous (Rotigotine [Neupro])  4 mg TD Q24H [facility]


   Stop: 04/11/18 18:44


Nifedipine (Procardia Xl)  30 mg PO DAILY [facility]


   Stop: 04/12/18 08:59


   Last Admin: 02/11/18 10:45 Dose:  30 mg








General: Alert, Oriented x3


HEENT: Atraumatic, PERRLA, EOMI


Neck: Supple


Cardiovascular: Regular rate


Abdomen: Bowel sounds, Soft


Extremities: no Clubbing, no Cyanosis, no Edema





- Procedures


Procedures: 


 Procedures











Procedure Code Date


 


GROUP PSYCHOTHERAPY 06707 12/08/15


 


GROUP PSYCHOTHERAPY GZHZZZZ 12/08/15














Assessment/Plan





- Assessment


Assessment: 





generalized weakness


10 lbs weight loss


dehydration


hyponatremia


Parkinson's disease


COPD


CAD


hyperlipidemia


Anxiety


Depression


Bipolar disorder


Essential tremors








- Plan


Plan: 





Swallow eval


IV fluids


GI consult


Neurology consult


Psychiatry consult


repeat CBC,CMP


CEA

## 2018-02-13 RX ADMIN — NIFEDIPINE SCH: 30 TABLET, FILM COATED, EXTENDED RELEASE ORAL at 13:53

## 2018-02-13 RX ADMIN — Medication SCH TAB: at 10:23

## 2018-02-14 LAB
ALBUMIN SERPL-MCNC: 3.3 GM/DL (ref 3.7–5.3)
ALBUMIN/GLOB SERPL: 1.4 {RATIO} (ref 1–1.8)
ALP SERPL-CCNC: 41 U/L (ref 34–104)
ALT SERPL-CCNC: 4 U/L (ref 7–52)
ANION GAP SERPL CALC-SCNC: 7.5 MMOL/L (ref 7–16)
AST SERPL-CCNC: 12 U/L (ref 13–39)
BILIRUB SERPL-MCNC: 0.4 MG/DL (ref 0.3–1)
BUN SERPL-MCNC: 11 MG/DL (ref 7–25)
CALCIUM SERPL-MCNC: 9.2 MG/DL (ref 8.6–10.3)
CHLORIDE SERPL-SCNC: 104 MEQ/L (ref 98–107)
CO2 SERPL-SCNC: 24.3 MEQ/L (ref 21–31)
CREAT SERPL-MCNC: 0.5 MG/DL (ref 0.6–1.2)
GLOBULIN SER-MCNC: 2.3 GM/DL
GLUCOSE SERPL-MCNC: 95 MG/DL (ref 70–105)
POTASSIUM SERPL-SCNC: 3.8 MEQ/L (ref 3.5–5.1)
SODIUM SERPL-SCNC: 132 MEQ/L (ref 136–145)

## 2018-02-14 RX ADMIN — Medication SCH: at 09:33

## 2018-02-14 RX ADMIN — NIFEDIPINE SCH MG: 30 TABLET, FILM COATED, EXTENDED RELEASE ORAL at 09:34

## 2018-02-14 NOTE — CONSULTATION
DATE OF CONSULTATION:     02/13/2018



HISTORY OF PRESENT ILLNESS:  A 72-year-old female presenting to [facility] coming from [facility], significant weight loss for the

past 4 weeks.  States she has been more tremulous, history of Parkinson's

disease.  She states that she stopped eating "I was having some shakiness." 

The patient noting anxiety.  She ___states__ she feels better, a lot more

motivated stating, "I really want to get my strength back," attesting to poor

sleep due to noise.  She states she is eating better now and states she has a

very strong appetite.  Denying overt depression or sadness at this time.



PAST PSYCHIATRIC HISTORY:  Denies any suicide history.



SOCIAL HISTORY:  She states she is  and , three

children, all adults.  The patient denying any drugs, alcohol or tobacco.



MEDICAL:  Reviewed.  Reported history of dementia, polyneuropathy, Parkinson's

and essential tremor.



MENTAL STATUS EXAMINATION:  Stated age.  Fair eye contact, fair orientation. 

Mood "better."  Affect constricted.  Thought processes were engaged,

appropriate behaviors.  No SI, no HI.  No overt psychotic symptoms.  Insight and

judgment fair.



PROVISIONAL DIAGNOSIS:  Dementia per history.  Mood unspecified.  Under medical:

 Please see full H and P.



RECOMMENDATIONS AND PLAN:  The patient is fairly stable from a psychiatric

perspective, would recommend the addition of Aricept or Namenda.  Otherwise, no

5150 criteria and no hold criteria.





DD: 02/13/2018 14:04

DT: 02/14/2018 07:25

JOB# [id number]  [id number]

COLE

## 2018-02-14 NOTE — CONSULTATION
DATE OF CONSULTATION:  02/13/2018



NEUROLOGY CONSULT



The patient is a 72-year-old.  The patient admitted initially with complaints of

getting weaker, not eating.  Has not eaten much for 3-4 weeks.  The patient had

swallowing assessment, the patient able to swallow, she is taking her

medications.



The patient diagnosis is that of Parkinson for a long time.  She is on

medications.  Predominantly, mainly left-sided tremor and some stiffness but

also right side.  Difficulty with ambulation.



The patient has history of:

1.  Dementia.

2.  Tremors.  The patient has ataxia.

3.  Neuropathy.

4.  Coronary artery disease.

5.  Hyperlipidemia.

6.  The patient has psychosis, bipolar.

7.  COPD.

8.  Anxiety.



MEDICATIONS:  As per reconciliation.  Here, the patient is on aspirin 81,

Wellbutrin 75, Sinemet 25/250 five a day, Vasotec, Ativan p.r.n., nifedipine.



REVIEW OF SYSTEMS:  As above.  The patient otherwise in bed.  Dysarthria.  No

seizures.  The patient has mainly tremor, more on the left side, immobility.  No

complaints of chest pain.  No marked shortness of breath.



PHYSICAL EXAMINATION:

VITAL SIGNS:  Temperature 97.2, blood pressure 130/70, pulse is 60.

NECK:  Supple.  No bruits.

HEART:  Sounds S1, S2.

LUNGS:  Clear.

NEUROLOGIC:  Awake, alert.  The patient will talk but has dysarthria.  Follows

simple instructions.  She will lift her arms to request.  The patient able to

name simple objects such as pen and glasses.  Does not know what day it is or

month.  Cranial:  Pupils react to light.  She is able to look laterally.  She is

able to actually look upward also.  Face immobile.

MOTOR:  The patient has increased tone, especially on the left side.  Tremor

predominantly left arm.  The patient has increased stiffness with a combination

of rigidity and some spasticity, more on the left.  The patient will lift both

legs up but limited.  Somewhat worse on the left.  Reflexes about 1+ upper

extremity, essentially absent at the knees and ankles.



INVESTIGATIONS:

1.  Parkinson, predominantly unilateral, but both sides also.  Mainly tremor,

left side.  Some component of also action tremor.



The patient on a reasonably high dose of Sinemet.  I am going to go ahead and

add some primidone to see whether that would help with the tremor.



The patient will have a CT scan done since predominantly symptoms are mainly

unilateral.

2.  Dementia.

3.  The patient with dysphagia.  Monitor this with swallowing assessment

repeated.

4.  Hyperlipidemia.

5.  Depression.

6.  Bipolar.



PLAN:  CT scan head.  Try primidone.  The patient to have lab studies.





DD: 02/13/2018 17:13

DT: 02/14/2018 15:12

JOB# [id number]  [id number]

## 2018-02-14 NOTE — GENERAL PROGRESS NOTE
Subjective





- Review of Systems


Service Date: 18


Subjective: 





Patient recently underwent swallow evaluation and diet has been modified. K+ 

level noted and patient given Krider 40meq. IV heplock. Paitent is comfortable. 





Mg level noted to be low. Will order supplementation. Patient comfortable





CEA 2.6 WNL





Objective





- Results


Result Diagrams: 


 18 09:30





 18 05:35


Recent Labs: 


 Laboratory Last Values











WBC  6.3 Th/cmm (4.8-10.8)   18  09:30    


 


RBC  4.27 Mil/cmm (3.80-5.20)   18  09:30    


 


Hgb  13.0 gm/dL (12-16)   18  09:30    


 


Hct  39.4 % (41.0-60)  L  18  09:30    


 


MCV  92.5 fl ()   18  09:30    


 


MCH  30.6 pg (27.0-31.0)   18  09:30    


 


MCHC Differential  33.1 pg (28.0-36.0)   18  09:30    


 


RDW  12.4 % (11.5-20.0)   18  09:30    


 


Plt Count  295 Th/cmm (150-400)   18  09:30    


 


MPV  7.9 fl  18  09:30    


 


Neutrophils %  66.3 % (40.0-80.0)   18  09:30    


 


Lymphocytes %  15.4 % (20.0-50.0)  L  18  09:30    


 


Monocytes %  9.5 % (2.0-10.0)   18  09:30    


 


Eosinophils %  8.8 % (0.0-5.0)  H  18  09:30    


 


Basophils %  0.0 % (0.0-2.0)   18  09:30    


 


Sodium  132 mEq/L (136-145)  L  18  05:35    


 


Potassium  3.8 mEq/L (3.5-5.1)   18  05:35    


 


Chloride  104 mEq/L ()   18  05:35    


 


Carbon Dioxide  24.3 mEq/L (21.0-31.0)   18  05:35    


 


Anion Gap  7.5  (7.0-16.0)   18  05:35    


 


BUN  11 mg/dL (7-25)   18  05:35    


 


Creatinine  0.5 mg/dL (0.6-1.2)  L  18  05:35    


 


Est GFR ( Amer)  > 60.0 ml/min (>90)   18  05:35    


 


Est GFR (Non-Af Amer)  > 60.0 ml/min  18  05:35    


 


BUN/Creatinine Ratio  22.0   18  05:35    


 


Glucose  95 mg/dL ()   18  05:35    


 


POC Glucose  92 MG/DL (70 - 105)   02/10/18  18:13    


 


Calcium  9.2 mg/dL (8.6-10.3)   18  05:35    


 


Magnesium  1.7 mg/dL (1.9-2.7)  L  18  15:31    


 


Total Bilirubin  0.4 mg/dL (0.3-1.0)   18  05:35    


 


AST  12 U/L (13-39)  L  18  05:35    


 


ALT  4 U/L (7-52)  L  18  05:35    


 


Alkaline Phosphatase  41 U/L ()   18  05:35    


 


Total Protein  5.6 gm/dL (6.0-8.3)  L  18  05:35    


 


Albumin  3.3 gm/dL (3.7-5.3)  L  18  05:35    


 


Globulin  2.3 gm/dL  18  05:35    


 


Albumin/Globulin Ratio  1.4  (1.0-1.8)   18  05:35    


 


TSH  3.65 uIU/ml (0.34-5.60)   18  05:35    


 


Urine Source  RANDOM   02/10/18  15:56    


 


Urine Color  YELLOW   02/10/18  15:56    


 


Urine Clarity  CLEAR  (CLEAR)   02/10/18  15:56    


 


Urine pH  7.0  (4.6 - 8.0)   02/10/18  15:56    


 


Ur Specific Gravity  1.015  (1.005-1.030)   02/10/18  15:56    


 


Urine Protein  NEGATIVE mg/dL (NEGATIVE)   02/10/18  15:56    


 


Urine Glucose (UA)  100 mg/dL (NEGATIVE)  H  02/10/18  15:56    


 


Urine Ketones  NEGATIVE mg/dL (NEGATIVE)   02/10/18  15:56    


 


Urine Blood  NEGATIVE  (NEGATIVE)   02/10/18  15:56    


 


Urine Nitrate  NEGATIVE  (NEGATIVE)   02/10/18  15:56    


 


Urine Bilirubin  NEGATIVE  (NEGATIVE)   02/10/18  15:56    


 


Urine Urobilinogen  0.2 E.U./dL (0.2 - 1.0)   02/10/18  15:56    


 


Ur Leukocyte Esterase  NEGATIVE  (NEGATIVE)   02/10/18  15:56    


 


Urine RBC  NONE SEEN /hpf (0-5)   02/10/18  15:56    


 


Urine WBC  0-2 /hpf (0-5)   02/10/18  15:56    


 


Ur Epithelial Cells  OCCASIONAL /lpf (FEW)   02/10/18  15:56    


 


Urine Bacteria  FEW /hpf (NONE SEEN)   02/10/18  15:56    














- Physical Exam


Vitals and I&O: 


 Vital Signs











Temp  96.9 F   18 04:00


 


Pulse  60   18 04:00


 


Resp  18   18 04:00


 


BP  143/68   18 04:00


 


Pulse Ox  97   18 04:00








 Intake & Output











 18





 18:59 06:59 18:59


 


Intake Total 500  


 


Balance 500  


 


Weight (lbs) 73.936 kg 75.931 kg 


 


Intake:   


 


  Oral 500  


 


Other:   


 


  # Voids 3 2 


 


  # Bowel Movements 2  











Active Medications: 


Current Medications





Al Hydrox/Mg Hydrox/Simethicone (Maalox)  30 ml PO Q4HR PRN


   PRN Reason: GI DISTRESS


   Stop: 18 18:30


Aspirin (Ecotrin)  81 mg PO DAILY [facility]


   Stop: 18 08:59


   Last Admin: 18 09:45 Dose:  81 mg


Bupropion HCl (Wellbutrin)  75 mg PO DAILY [facility]


   PRN Reason: Protocol


   Stop: 18 08:59


   Last Admin: 18 10:23 Dose:  75 mg


Carbidopa/Levodopa (Sinemet 25 Mg-250 Mg)  1 tab PO 5XD [facility]


   Stop: 18 21:59


   Last Admin: 18 05:44 Dose:  1 tab


Diclofenac Sodium (Voltaren)  25 mg PO BID [facility]


   Stop: 18 08:59


   Last Admin: 18 18:19 Dose:  25 mg


Docusate Sodium (Colace)  200 mg PO BID [facility]


   Stop: 18 19:29


   Last Admin: 18 18:19 Dose:  Not Given


Enalapril Maleate (Vasotec)  20 mg PO DAILY [facility]


   Stop: 18 08:59


   Last Admin: 18 10:24 Dose:  20 mg


Lorazepam (Ativan)  1 mg PO Q6HR PRN; Protocol


   PRN Reason: Anxiety


   Stop: 18 18:30


   Last Admin: 18 18:19 Dose:  1 mg


Magnesium Hydroxide (Milk Of Magnesia)  30 ml PO HS PRN


   PRN Reason: Constipation


   Stop: 18 18:30


Magnesium Oxide (Mag-Oxide)  400 mg PO X1 [facility]


   Stop: 18 08:09


Miscellaneous (Rotigotine [Neupro])  4 mg TD Q24H [facility]


   Stop: 18 18:44


Nifedipine (Procardia Xl)  30 mg PO DAILY [facility]


   Stop: 18 08:59


   Last Admin: 18 13:53 Dose:  Not Given


Primidone (Mysoline)  50 mg PO HS [facility]


   Stop: 18 20:59


   Last Admin: 18 21:17 Dose:  50 mg








General: Alert, Oriented x3


HEENT: Atraumatic, PERRLA, EOMI


Neck: Supple


Cardiovascular: Regular rate


Abdomen: Bowel sounds, Soft


Extremities: no Clubbing, no Cyanosis, no Edema





- Procedures


Procedures: 


 Procedures











Procedure Code Date


 


GROUP PSYCHOTHERAPY 29678 12/08/15


 


GROUP PSYCHOTHERAPY GZHZZZZ 12/08/15














Assessment/Plan





- Assessment


Assessment: 





generalized weakness


10 lbs weight loss


dehydration


hyponatremia


Parkinson's disease


COPD


CAD


hyperlipidemia


Anxiety


Depression


Bipolar disorder


Essential tremors


hypomagnesemia


hypokalemia





- Plan


Plan: 





Swallow eval


IV fluids


GI consult


Neurology consult


Psychiatry consult


repeat CBC,CMP


CEA


will give Mg Oxide PO x 1 dose. 





Patient stable. will discharge back to SNF with same orders. 





Nutritional Asmnt/Malnutr-PDOC





- Dietary Evaluation


Malnutrition Findings (Please click <Entered> for more info): 








Nutritional Asmnt/Malnutrition                             Start:  18 08:

38


Text:                                                      Status: Complete    

  


Freq:                                                                          

  


 Document     18 08:38  FNS.D01  (Rec: 18 08:49  [id number].[id number]  [id number])


 Nutritional Asmnt/Malnutrition


     Patient General Information


      Nutritional Screening                      High Risk


      Diagnosis                                  Dehydration, wt loss,


                                                 hyponatremia


      Pertinent Medical Hx/Surgical Hx           Parkinson's, CAD, HLD, COPD,


                                                 dementia, GERD


      Subjective Information                     Pt very tearful. Being fed


                                                 breakfast by staff, unable to


                                                 use left hand. Pt reports wt


                                                 loss of ~10 lbs/4 weeks due to


                                                 being "tired of same foods"


                                                 at nursing facility. UBW: 170


                                                 lbs. Drinks chocolate Ensure


                                                 at facility. Dysphagia noted


                                                 upon admit, was seen by speech


                                                 and cleared for mechanical


                                                 soft diet.


      Current Diet Order/ Nutrition Support      mechanical soft, chopped


      Patient / S.O                              Not Indicated


      Pertinent Medications                      colace, milk of magnesia


      Pertinent Labs                             Na: 132, K: 3.2


     Nutritional Hx/Data


      Height                                     1.68 m


      Height (Calculated Centimeters)            167.6


      Current Weight (lbs)                       73.936 kg


      Weight (Calculated Kilograms)              73.9


      Weight (Calculated Grams)                  17577.6


      Usual body Weight (lbs)                    170


      % Usual Body Weight                        96


      Ideal Body Weight                          130


      % Ideal Body Weight                        125


      Body Mass Index (BMI)                      26.3


      Recent Weight Change                       Yes


      Weight Status                              Overweight


     GI Symptoms


      GI Symptoms                                Constipation


      Last BM                                    none since admit- on colace


                                                 and MOM


      Difficult in:                              Chewing


                                                 Swallowing


      Food Allergies                             Yes: blue dye allergy


      Cultural/Ethnic/Taoist Belief           n/a


      Usual diet at home                         regular


      Skin Integrity/Comment:                    intact


      Current %PO                                Fair (50-74%)


     Estimated Nutritional Goals


      EEE in Kcals:                              1,850


      BEE in Kcals:                              Using Current wt


      Calories/Kcals/Kg                          25


      Kcals Calculated                           7862-2758 kcals (25-30 kcals/


                                                 kg)


      Protein:                                   Using Current wt


      Protein g/k


      Protein Calculated                         74


      Fluid: ml                                  4422-7690 mL (1 ml/kcal)


     Nutritional Problem


      2. Problem


       Problem                                   self feeding difficulty


       Etiology                                  parkinson's


       Signs/Symptoms:                           need for assist with meals


      1. Problem


       Problem                                   involuntary wt loss


       Etiology                                  dislike of food at nursing


                                                 home, ? swallowing difficulty


       Signs/Symptoms:                           7 lb wt loss/1 month (4.1%)


     Intervention/Recommendation


      Recommendations by RD                      Increase Calorie Intake


                                                 Protein supplementation


      Comments                                   1. Add Boost BID with


                                                 breakfast and lunch (chocolate


                                                 flavor)


                                                 2. Assist with meals


                                                 3. Continue regular diet,


                                                 texture per speech


     Expected Outcomes/Goals


      Expected Outcomes/Goals                    Goal: PO intake >50%, wt


                                                 stability, skin to remain


                                                 intact, labs WNL


                                                 monitor: wt, labs, skin, PO


                                                 intake

## 2018-02-14 NOTE — GENERAL PROGRESS NOTE
Subjective





- Review of Systems


Service Date: 18


Subjective: 





Late Entry..





Patient recently underwent swallow evaluation and diet has been modified. K+ 

level noted and patient given Krider 40meq. IV heplock. Paitent is comfortably. 





Objective





- Results


Result Diagrams: 


 18 09:30





 18 05:35


Recent Labs: 


 Laboratory Last Values











WBC  6.3 Th/cmm (4.8-10.8)   18  09:30    


 


RBC  4.27 Mil/cmm (3.80-5.20)   18  09:30    


 


Hgb  13.0 gm/dL (12-16)   18  09:30    


 


Hct  39.4 % (41.0-60)  L  18  09:30    


 


MCV  92.5 fl ()   18  09:30    


 


MCH  30.6 pg (27.0-31.0)   18  09:30    


 


MCHC Differential  33.1 pg (28.0-36.0)   18  09:30    


 


RDW  12.4 % (11.5-20.0)   18  09:30    


 


Plt Count  295 Th/cmm (150-400)   18  09:30    


 


MPV  7.9 fl  18  09:30    


 


Neutrophils %  66.3 % (40.0-80.0)   18  09:30    


 


Lymphocytes %  15.4 % (20.0-50.0)  L  18  09:30    


 


Monocytes %  9.5 % (2.0-10.0)   18  09:30    


 


Eosinophils %  8.8 % (0.0-5.0)  H  18  09:30    


 


Basophils %  0.0 % (0.0-2.0)   18  09:30    


 


Sodium  132 mEq/L (136-145)  L  18  05:35    


 


Potassium  3.8 mEq/L (3.5-5.1)   18  05:35    


 


Chloride  104 mEq/L ()   18  05:35    


 


Carbon Dioxide  24.3 mEq/L (21.0-31.0)   18  05:35    


 


Anion Gap  7.5  (7.0-16.0)   18  05:35    


 


BUN  11 mg/dL (7-25)   18  05:35    


 


Creatinine  0.5 mg/dL (0.6-1.2)  L  18  05:35    


 


Est GFR ( Amer)  > 60.0 ml/min (>90)   18  05:35    


 


Est GFR (Non-Af Amer)  > 60.0 ml/min  18  05:35    


 


BUN/Creatinine Ratio  22.0   18  05:35    


 


Glucose  95 mg/dL ()   18  05:35    


 


POC Glucose  92 MG/DL (70 - 105)   02/10/18  18:13    


 


Calcium  9.2 mg/dL (8.6-10.3)   18  05:35    


 


Magnesium  1.7 mg/dL (1.9-2.7)  L  18  15:31    


 


Total Bilirubin  0.4 mg/dL (0.3-1.0)   18  05:35    


 


AST  12 U/L (13-39)  L  18  05:35    


 


ALT  4 U/L (7-52)  L  18  05:35    


 


Alkaline Phosphatase  41 U/L ()   18  05:35    


 


Total Protein  5.6 gm/dL (6.0-8.3)  L  18  05:35    


 


Albumin  3.3 gm/dL (3.7-5.3)  L  18  05:35    


 


Globulin  2.3 gm/dL  18  05:35    


 


Albumin/Globulin Ratio  1.4  (1.0-1.8)   18  05:35    


 


TSH  3.65 uIU/ml (0.34-5.60)   18  05:35    


 


Urine Source  RANDOM   02/10/18  15:56    


 


Urine Color  YELLOW   02/10/18  15:56    


 


Urine Clarity  CLEAR  (CLEAR)   02/10/18  15:56    


 


Urine pH  7.0  (4.6 - 8.0)   02/10/18  15:56    


 


Ur Specific Gravity  1.015  (1.005-1.030)   02/10/18  15:56    


 


Urine Protein  NEGATIVE mg/dL (NEGATIVE)   02/10/18  15:56    


 


Urine Glucose (UA)  100 mg/dL (NEGATIVE)  H  02/10/18  15:56    


 


Urine Ketones  NEGATIVE mg/dL (NEGATIVE)   02/10/18  15:56    


 


Urine Blood  NEGATIVE  (NEGATIVE)   02/10/18  15:56    


 


Urine Nitrate  NEGATIVE  (NEGATIVE)   02/10/18  15:56    


 


Urine Bilirubin  NEGATIVE  (NEGATIVE)   02/10/18  15:56    


 


Urine Urobilinogen  0.2 E.U./dL (0.2 - 1.0)   02/10/18  15:56    


 


Ur Leukocyte Esterase  NEGATIVE  (NEGATIVE)   02/10/18  15:56    


 


Urine RBC  NONE SEEN /hpf (0-5)   02/10/18  15:56    


 


Urine WBC  0-2 /hpf (0-5)   02/10/18  15:56    


 


Ur Epithelial Cells  OCCASIONAL /lpf (FEW)   02/10/18  15:56    


 


Urine Bacteria  FEW /hpf (NONE SEEN)   02/10/18  15:56    














- Physical Exam


Vitals and I&O: 


 Vital Signs











Temp  96.9 F   18 04:00


 


Pulse  60   18 04:00


 


Resp  18   18 04:00


 


BP  143/68   18 04:00


 


Pulse Ox  97   18 04:00








 Intake & Output











 18





 18:59 06:59 18:59


 


Intake Total 500  


 


Balance 500  


 


Weight (lbs) 73.936 kg 75.931 kg 


 


Intake:   


 


  Oral 500  


 


Other:   


 


  # Voids 3 2 


 


  # Bowel Movements 2  











Active Medications: 


Current Medications





Al Hydrox/Mg Hydrox/Simethicone (Maalox)  30 ml PO Q4HR PRN


   PRN Reason: GI DISTRESS


   Stop: 18 18:30


Aspirin (Ecotrin)  81 mg PO DAILY HENRY


   Stop: 18 08:59


   Last Admin: 18 09:45 Dose:  81 mg


Bupropion HCl (Wellbutrin)  75 mg PO DAILY HENRY


   PRN Reason: Protocol


   Stop: 18 08:59


   Last Admin: 18 10:23 Dose:  75 mg


Carbidopa/Levodopa (Sinemet 25 Mg-250 Mg)  1 tab PO 5XD HENRY


   Stop: 18 21:59


   Last Admin: 18 05:44 Dose:  1 tab


Diclofenac Sodium (Voltaren)  25 mg PO BID HENRY


   Stop: 18 08:59


   Last Admin: 18 18:19 Dose:  25 mg


Docusate Sodium (Colace)  200 mg PO BID HENRY


   Stop: 18 19:29


   Last Admin: 18 18:19 Dose:  Not Given


Enalapril Maleate (Vasotec)  20 mg PO DAILY HENRY


   Stop: 18 08:59


   Last Admin: 18 10:24 Dose:  20 mg


Lorazepam (Ativan)  1 mg PO Q6HR PRN; Protocol


   PRN Reason: Anxiety


   Stop: 18 18:30


   Last Admin: 18 18:19 Dose:  1 mg


Magnesium Hydroxide (Milk Of Magnesia)  30 ml PO HS PRN


   PRN Reason: Constipation


   Stop: 18 18:30


Magnesium Oxide (Mag-Oxide)  400 mg PO X1 ONE


   Stop: 18 08:09


Miscellaneous (Rotigotine [Neupro])  4 mg TD Q24H HENRY


   Stop: 18 18:44


Nifedipine (Procardia Xl)  30 mg PO DAILY HENRY


   Stop: 18 08:59


   Last Admin: 18 13:53 Dose:  Not Given


Primidone (Mysoline)  50 mg PO HS HENRY


   Stop: 18 20:59


   Last Admin: 18 21:17 Dose:  50 mg








General: Alert, Oriented x3


HEENT: Atraumatic, PERRLA, EOMI


Neck: Supple


Cardiovascular: Regular rate


Abdomen: Bowel sounds, Soft


Extremities: no Clubbing, no Cyanosis, no Edema





- Procedures


Procedures: 


 Procedures











Procedure Code Date


 


GROUP PSYCHOTHERAPY 36907 12/08/15


 


GROUP PSYCHOTHERAPY [id number] 12/08/15














Assessment/Plan





- Assessment


Assessment: 





generalized weakness


10 lbs weight loss


dehydration


hyponatremia


Parkinson's disease


COPD


CAD


hyperlipidemia


Anxiety


Depression


Bipolar disorder


Essential tremors








- Plan


Plan: 





Swallow eval


IV fluids


GI consult


Neurology consult


Psychiatry consult


repeat CBC,CMP


CEA








Nutritional Asmnt/Malnutr-PDOC





- Dietary Evaluation


Malnutrition Findings (Please click <Entered> for more info): 








Nutritional Asmnt/Malnutrition                             Start:  18 08:

38


Text:                                                      Status: Complete    

  


Freq:                                                                          

  


 Document     18 08:38  FNS.D01  (Rec: 18 08:49  [id number].D01  [email or web address])


 Nutritional Asmnt/Malnutrition


     Patient General Information


      Nutritional Screening                      High Risk


      Diagnosis                                  Dehydration, wt loss,


                                                 hyponatremia


      Pertinent Medical Hx/Surgical Hx           Parkinson's, CAD, HLD, COPD,


                                                 dementia, GERD


      Subjective Information                     Pt very tearful. Being fed


                                                 breakfast by staff, unable to


                                                 use left hand. Pt reports wt


                                                 loss of ~10 lbs/4 weeks due to


                                                 being "tired of same foods"


                                                 at nursing facility. UBW: 170


                                                 lbs. Drinks chocolate Ensure


                                                 at facility. Dysphagia noted


                                                 upon admit, was seen by speech


                                                 and cleared for mechanical


                                                 soft diet.


      Current Diet Order/ Nutrition Support      mechanical soft, chopped


      Patient / S.O                              Not Indicated


      Pertinent Medications                      colace, milk of magnesia


      Pertinent Labs                             Na: 132, K: 3.2


     Nutritional Hx/Data


      Height                                     1.68 m


      Height (Calculated Centimeters)            167.6


      Current Weight (lbs)                       73.936 kg


      Weight (Calculated Kilograms)              73.9


      Weight (Calculated Grams)                  72584.6


      Usual body Weight (lbs)                    170


      % Usual Body Weight                        96


      Ideal Body Weight                          130


      % Ideal Body Weight                        125


      Body Mass Index (BMI)                      26.3


      Recent Weight Change                       Yes


      Weight Status                              Overweight


     GI Symptoms


      GI Symptoms                                Constipation


      Last BM                                    none since admit- on colace


                                                 and MOM


      Difficult in:                              Chewing


                                                 Swallowing


      Food Allergies                             Yes: blue dye allergy


      Cultural/Ethnic/Sikhism Belief           n/a


      Usual diet at home                         regular


      Skin Integrity/Comment:                    intact


      Current %PO                                Fair (50-74%)


     Estimated Nutritional Goals


      EEE in Kcals:                              1,850


      BEE in Kcals:                              Using Current wt


      Calories/Kcals/Kg                          25


      Kcals Calculated                           4376-3646 kcals (25-30 kcals/


                                                 kg)


      Protein:                                   Using Current wt


      Protein g/k


      Protein Calculated                         74


      Fluid: ml                                  8512-8271 mL (1 ml/kcal)


     Nutritional Problem


      2. Problem


       Problem                                   self feeding difficulty


       Etiology                                  parkinson's


       Signs/Symptoms:                           need for assist with meals


      1. Problem


       Problem                                   involuntary wt loss


       Etiology                                  dislike of food at nursing


                                                 home, ? swallowing difficulty


       Signs/Symptoms:                           7 lb wt loss/1 month (4.1%)


     Intervention/Recommendation


      Recommendations by RD                      Increase Calorie Intake


                                                 Protein supplementation


      Comments                                   1. Add Boost BID with


                                                 breakfast and lunch (chocolate


                                                 flavor)


                                                 2. Assist with meals


                                                 3. Continue regular diet,


                                                 texture per speech


     Expected Outcomes/Goals


      Expected Outcomes/Goals                    Goal: PO intake >50%, wt


                                                 stability, skin to remain


                                                 intact, labs WNL


                                                 monitor: wt, labs, skin, PO


                                                 intake

## 2018-02-15 LAB
FOLATE SERPL-MCNC: 8.4 NG/ML (ref 3–?)
VIT B12 SERPL-MCNC: 498 PG/ML (ref 232–1245)

## 2018-02-15 NOTE — DISCHARGE SUMMARY
DATE OF DISCHARGE:  02/14/2018



PRELIMINARY DIAGNOSES:

1.  Generalized weakness.

2.  Dehydration.

3.  Malnutrition.

4.  Hyponatremia.

5.  Dementia.

6.  Parkinson disease.

7.  Anxiety.

8.  Depression.



DISCHARGE DIAGNOSES:

1.  Dehydration, now corrected.

2.  Hyponatremia, now corrected.

3.  Parkinson disease.

4.  Dementia.

5.  Anxiety.

6.  Depression.



BRIEF HPI:  This is a 72-year-old female who presents to [facility] ER for difficulty swallowing for the past 4 weeks noted at the nursing

facility.  The patient was also found to have lost 10 pounds during the same

duration.  The patient has a history of Parkinson disease and over the course of

4 years, has progressed to a point where she is unable to transfer from her

wheelchair to the toilet and has needed assistance in and out of bed.  Recently,

the patient has been noted to have difficulty swallowing and losing weight.  The

patient presents with dehydration and hyponatremia and has been noted to have

increased tremors to the left upper extremity.  Initial lab work:  White count

was 7.5, hemoglobin of 13.8, hematocrit of 41.3, platelets of 350.  Sodium is

135, potassium 4.9, BUN 27, creatinine 0.8, glucose 105.  AST was 24, ALT 11,

alk phos 57.  The patient's initial vital signs:  Blood pressure was 140/76. 

The patient was subsequently admitted for further evaluation and treatment.



HOSPITAL COURSE:  The patient had had a swallow evaluation, which she had passed

and her diet had been modified to a pureed diet.  The patient was given also IV

fluids to correct her hyponatremia as well.  CEA was 2.5.  A GI consult was also

ordered along with a neuro consult and psychiatric consult.  The patient was

subsequently discharged in stable condition, to return back to nursing home with

continued care.





DD: 02/15/2018 10:34

DT: 02/15/2018 20:08

JOB# [id number]  [id number]

## 2020-02-26 ENCOUNTER — HOSPITAL ENCOUNTER (INPATIENT)
Dept: HOSPITAL 4 - SED | Age: 75
LOS: 13 days | Discharge: NURSING, REHAB OR LONG-TERM CARE | DRG: 193 | End: 2020-03-10
Payer: COMMERCIAL

## 2020-02-26 VITALS — BODY MASS INDEX: 25.99 KG/M2 | WEIGHT: 156 LBS | HEIGHT: 65 IN

## 2020-02-26 VITALS — SYSTOLIC BLOOD PRESSURE: 117 MMHG

## 2020-02-26 DIAGNOSIS — K57.90: ICD-10-CM

## 2020-02-26 DIAGNOSIS — E11.9: ICD-10-CM

## 2020-02-26 DIAGNOSIS — F41.1: ICD-10-CM

## 2020-02-26 DIAGNOSIS — E66.3: ICD-10-CM

## 2020-02-26 DIAGNOSIS — J96.01: ICD-10-CM

## 2020-02-26 DIAGNOSIS — J44.1: ICD-10-CM

## 2020-02-26 DIAGNOSIS — Z99.3: ICD-10-CM

## 2020-02-26 DIAGNOSIS — Z87.891: ICD-10-CM

## 2020-02-26 DIAGNOSIS — I11.0: ICD-10-CM

## 2020-02-26 DIAGNOSIS — Z79.01: ICD-10-CM

## 2020-02-26 DIAGNOSIS — I48.91: ICD-10-CM

## 2020-02-26 DIAGNOSIS — M81.0: ICD-10-CM

## 2020-02-26 DIAGNOSIS — Z88.8: ICD-10-CM

## 2020-02-26 DIAGNOSIS — K21.9: ICD-10-CM

## 2020-02-26 DIAGNOSIS — Z79.899: ICD-10-CM

## 2020-02-26 DIAGNOSIS — F31.9: ICD-10-CM

## 2020-02-26 DIAGNOSIS — J10.08: Primary | ICD-10-CM

## 2020-02-26 DIAGNOSIS — G20: ICD-10-CM

## 2020-02-26 DIAGNOSIS — Z87.440: ICD-10-CM

## 2020-02-26 DIAGNOSIS — Z74.01: ICD-10-CM

## 2020-02-26 DIAGNOSIS — J44.0: ICD-10-CM

## 2020-02-26 DIAGNOSIS — R13.10: ICD-10-CM

## 2020-02-26 DIAGNOSIS — I25.10: ICD-10-CM

## 2020-02-26 DIAGNOSIS — Z86.711: ICD-10-CM

## 2020-02-26 DIAGNOSIS — J12.9: ICD-10-CM

## 2020-02-26 DIAGNOSIS — Z91.041: ICD-10-CM

## 2020-02-26 DIAGNOSIS — E78.5: ICD-10-CM

## 2020-02-26 DIAGNOSIS — I50.9: ICD-10-CM

## 2020-02-26 DIAGNOSIS — F02.80: ICD-10-CM

## 2020-02-26 LAB
ALBUMIN SERPL BCP-MCNC: 3 G/DL (ref 3.4–4.8)
ALT SERPL W P-5'-P-CCNC: 6 U/L (ref 12–78)
ANION GAP SERPL CALCULATED.3IONS-SCNC: 6 MMOL/L (ref 5–15)
AST SERPL W P-5'-P-CCNC: 15 U/L (ref 10–37)
BASOPHILS # BLD AUTO: 0 K/UL (ref 0–0.2)
BASOPHILS NFR BLD AUTO: 0.2 % (ref 0–2)
BILIRUB SERPL-MCNC: 0.5 MG/DL (ref 0–1)
BUN SERPL-MCNC: 33 MG/DL (ref 8–21)
CALCIUM SERPL-MCNC: 9.2 MG/DL (ref 8.4–11)
CHLORIDE SERPL-SCNC: 104 MMOL/L (ref 98–107)
CREAT SERPL-MCNC: 1.01 MG/DL (ref 0.55–1.3)
EOSINOPHIL # BLD AUTO: 0 K/UL (ref 0–0.4)
EOSINOPHIL NFR BLD AUTO: 0 % (ref 0–4)
ERYTHROCYTE [DISTWIDTH] IN BLOOD BY AUTOMATED COUNT: 14.7 % (ref 9–15)
GFR SERPL CREATININE-BSD FRML MDRD: (no result) ML/MIN (ref 90–?)
GLUCOSE SERPL-MCNC: 151 MG/DL (ref 70–99)
HCT VFR BLD AUTO: 38.3 % (ref 36–48)
HGB BLD-MCNC: 12.3 G/DL (ref 12–16)
INR PPP: 1.2 (ref 0.8–1.2)
LYMPHOCYTES # BLD AUTO: 0.3 K/UL (ref 1–5.5)
LYMPHOCYTES NFR BLD AUTO: 2.6 % (ref 20.5–51.5)
MCH RBC QN AUTO: 29 PG (ref 27–31)
MCHC RBC AUTO-ENTMCNC: 32 % (ref 32–36)
MCV RBC AUTO: 91 FL (ref 79–98)
MONOCYTES # BLD MANUAL: 1 K/UL (ref 0–1)
MONOCYTES # BLD MANUAL: 8.2 % (ref 1.7–9.3)
NEUTROPHILS # BLD AUTO: 11.3 K/UL (ref 1.8–7.7)
NEUTROPHILS NFR BLD AUTO: 89 % (ref 40–70)
PLATELET # BLD AUTO: 245 K/UL (ref 130–430)
POTASSIUM SERPL-SCNC: 4.3 MMOL/L (ref 3.5–5.1)
PROTHROMBIN TIME: 11.7 SECS (ref 9.5–12.5)
RBC # BLD AUTO: 4.21 MIL/UL (ref 4.2–6.2)
SODIUM SERPLBLD-SCNC: 139 MMOL/L (ref 136–145)
WBC # BLD AUTO: 12.7 K/UL (ref 4.8–10.8)

## 2020-02-26 PROCEDURE — G9035 OSELTAMIVIR PHOSP, BRAND: HCPCS

## 2020-02-27 VITALS — SYSTOLIC BLOOD PRESSURE: 109 MMHG

## 2020-02-27 VITALS — SYSTOLIC BLOOD PRESSURE: 108 MMHG

## 2020-02-27 VITALS — SYSTOLIC BLOOD PRESSURE: 125 MMHG

## 2020-02-27 VITALS — SYSTOLIC BLOOD PRESSURE: 120 MMHG

## 2020-02-27 VITALS — SYSTOLIC BLOOD PRESSURE: 135 MMHG

## 2020-02-27 VITALS — SYSTOLIC BLOOD PRESSURE: 142 MMHG

## 2020-02-27 VITALS — SYSTOLIC BLOOD PRESSURE: 92 MMHG

## 2020-02-27 VITALS — SYSTOLIC BLOOD PRESSURE: 136 MMHG

## 2020-02-27 VITALS — SYSTOLIC BLOOD PRESSURE: 110 MMHG

## 2020-02-27 VITALS — SYSTOLIC BLOOD PRESSURE: 93 MMHG

## 2020-02-27 VITALS — SYSTOLIC BLOOD PRESSURE: 143 MMHG

## 2020-02-27 VITALS — SYSTOLIC BLOOD PRESSURE: 129 MMHG

## 2020-02-27 VITALS — SYSTOLIC BLOOD PRESSURE: 134 MMHG

## 2020-02-27 VITALS — SYSTOLIC BLOOD PRESSURE: 122 MMHG

## 2020-02-27 VITALS — SYSTOLIC BLOOD PRESSURE: 89 MMHG

## 2020-02-27 VITALS — SYSTOLIC BLOOD PRESSURE: 105 MMHG

## 2020-02-27 VITALS — SYSTOLIC BLOOD PRESSURE: 91 MMHG

## 2020-02-27 VITALS — SYSTOLIC BLOOD PRESSURE: 90 MMHG

## 2020-02-27 VITALS — SYSTOLIC BLOOD PRESSURE: 138 MMHG

## 2020-02-27 VITALS — SYSTOLIC BLOOD PRESSURE: 115 MMHG

## 2020-02-27 VITALS — SYSTOLIC BLOOD PRESSURE: 114 MMHG

## 2020-02-27 LAB
ALBUMIN SERPL BCP-MCNC: 2.4 G/DL (ref 3.4–4.8)
ALT SERPL W P-5'-P-CCNC: 12 U/L (ref 12–78)
ANION GAP SERPL CALCULATED.3IONS-SCNC: 3 MMOL/L (ref 5–15)
APPEARANCE UR: CLEAR
AST SERPL W P-5'-P-CCNC: 14 U/L (ref 10–37)
BACTERIA URNS QL MICRO: (no result) /HPF
BASOPHILS # BLD AUTO: 0 K/UL (ref 0–0.2)
BASOPHILS NFR BLD AUTO: 0.1 % (ref 0–2)
BILIRUB SERPL-MCNC: 0.4 MG/DL (ref 0–1)
BILIRUB UR QL STRIP: NEGATIVE
BUN SERPL-MCNC: 36 MG/DL (ref 8–21)
CALCIUM SERPL-MCNC: 8.8 MG/DL (ref 8.4–11)
CHLORIDE SERPL-SCNC: 107 MMOL/L (ref 98–107)
CHOLEST SERPL-MCNC: 121 MG/DL (ref ?–200)
COLOR UR: YELLOW
CREAT SERPL-MCNC: 0.9 MG/DL (ref 0.55–1.3)
EOSINOPHIL # BLD AUTO: 0 K/UL (ref 0–0.4)
EOSINOPHIL NFR BLD AUTO: 0 % (ref 0–4)
ERYTHROCYTE [DISTWIDTH] IN BLOOD BY AUTOMATED COUNT: 14.7 % (ref 9–15)
GFR SERPL CREATININE-BSD FRML MDRD: (no result) ML/MIN (ref 90–?)
GLUCOSE SERPL-MCNC: 110 MG/DL (ref 70–99)
GLUCOSE UR STRIP-MCNC: NEGATIVE MG/DL
HCT VFR BLD AUTO: 36.9 % (ref 36–48)
HDLC SERPL-MCNC: 54 MG/DL (ref 55–?)
HGB BLD-MCNC: 11.9 G/DL (ref 12–16)
HGB UR QL STRIP: (no result)
KETONES UR STRIP-MCNC: (no result) MG/DL
LDL CHOLESTEROL: 49 MG/DL (ref ?–100)
LEUKOCYTE ESTERASE UR QL STRIP: NEGATIVE
LYMPHOCYTES # BLD AUTO: 1 K/UL (ref 1–5.5)
LYMPHOCYTES NFR BLD AUTO: 9.5 % (ref 20.5–51.5)
MCH RBC QN AUTO: 29 PG (ref 27–31)
MCHC RBC AUTO-ENTMCNC: 32 % (ref 32–36)
MCV RBC AUTO: 91 FL (ref 79–98)
MONOCYTES # BLD MANUAL: 1 K/UL (ref 0–1)
MONOCYTES # BLD MANUAL: 9.4 % (ref 1.7–9.3)
NEUTROPHILS # BLD AUTO: 8.4 K/UL (ref 1.8–7.7)
NEUTROPHILS NFR BLD AUTO: 81 % (ref 40–70)
NITRITE UR QL STRIP: NEGATIVE
PH UR STRIP: 6.5 [PH] (ref 5–8)
PLATELET # BLD AUTO: 213 K/UL (ref 130–430)
POTASSIUM SERPL-SCNC: 5 MMOL/L (ref 3.5–5.1)
PROT UR QL STRIP: NEGATIVE
RBC # BLD AUTO: 4.05 MIL/UL (ref 4.2–6.2)
RBC #/AREA URNS HPF: (no result) /HPF (ref 0–3)
SODIUM SERPLBLD-SCNC: 143 MMOL/L (ref 136–145)
SP GR UR STRIP: 1.02 (ref 1–1.03)
TRIGL SERPL-MCNC: 50 MG/DL (ref 30–150)
TSH SERPL DL<=0.05 MIU/L-ACNC: 1.05 UIU/ML (ref 0.36–3.74)
UROBILINOGEN UR STRIP-MCNC: 1 MG/DL (ref 0.2–1)
WBC # BLD AUTO: 10.4 K/UL (ref 4.8–10.8)
WBC #/AREA URNS HPF: (no result) /HPF (ref 0–3)

## 2020-02-27 RX ADMIN — APIXABAN SCH MG: 2.5 TABLET, FILM COATED ORAL at 20:22

## 2020-02-27 RX ADMIN — SODIUM CHLORIDE SCH MLS/HR: 9 INJECTION, SOLUTION INTRAVENOUS at 05:05

## 2020-02-27 RX ADMIN — OSELTAMIVIR PHOSPHATE SCH MG: 75 CAPSULE ORAL at 20:22

## 2020-02-27 RX ADMIN — METHYLPREDNISOLONE SODIUM SUCCINATE SCH MG: 125 INJECTION, POWDER, FOR SOLUTION INTRAMUSCULAR; INTRAVENOUS at 21:11

## 2020-02-27 RX ADMIN — IPRATROPIUM BROMIDE SCH MG: 0.5 SOLUTION RESPIRATORY (INHALATION) at 13:00

## 2020-02-27 RX ADMIN — PANTOPRAZOLE SODIUM SCH MG: 40 TABLET, DELAYED RELEASE ORAL at 09:00

## 2020-02-27 RX ADMIN — ALBUTEROL SULFATE SCH MG: 2.5 SOLUTION RESPIRATORY (INHALATION) at 19:00

## 2020-02-27 RX ADMIN — DEXTROSE MONOHYDRATE SCH MLS/HR: 50 INJECTION, SOLUTION INTRAVENOUS at 08:59

## 2020-02-27 RX ADMIN — DOCUSATE SODIUM SCH MG: 100 CAPSULE, LIQUID FILLED ORAL at 20:22

## 2020-02-27 RX ADMIN — DOCUSATE SODIUM SCH MG: 100 CAPSULE, LIQUID FILLED ORAL at 09:00

## 2020-02-27 RX ADMIN — ALBUTEROL SULFATE SCH MG: 2.5 SOLUTION RESPIRATORY (INHALATION) at 13:00

## 2020-02-27 RX ADMIN — IPRATROPIUM BROMIDE SCH MG: 0.5 SOLUTION RESPIRATORY (INHALATION) at 19:00

## 2020-02-27 RX ADMIN — SODIUM CHLORIDE SCH MLS/HR: 9 INJECTION, SOLUTION INTRAVENOUS at 17:04

## 2020-02-28 VITALS — SYSTOLIC BLOOD PRESSURE: 121 MMHG

## 2020-02-28 VITALS — SYSTOLIC BLOOD PRESSURE: 151 MMHG

## 2020-02-28 VITALS — SYSTOLIC BLOOD PRESSURE: 148 MMHG

## 2020-02-28 VITALS — SYSTOLIC BLOOD PRESSURE: 138 MMHG

## 2020-02-28 VITALS — SYSTOLIC BLOOD PRESSURE: 152 MMHG

## 2020-02-28 VITALS — SYSTOLIC BLOOD PRESSURE: 141 MMHG

## 2020-02-28 VITALS — SYSTOLIC BLOOD PRESSURE: 135 MMHG

## 2020-02-28 VITALS — SYSTOLIC BLOOD PRESSURE: 147 MMHG

## 2020-02-28 VITALS — SYSTOLIC BLOOD PRESSURE: 140 MMHG

## 2020-02-28 VITALS — SYSTOLIC BLOOD PRESSURE: 139 MMHG

## 2020-02-28 VITALS — SYSTOLIC BLOOD PRESSURE: 137 MMHG

## 2020-02-28 VITALS — SYSTOLIC BLOOD PRESSURE: 149 MMHG

## 2020-02-28 VITALS — SYSTOLIC BLOOD PRESSURE: 142 MMHG

## 2020-02-28 VITALS — SYSTOLIC BLOOD PRESSURE: 113 MMHG

## 2020-02-28 VITALS — SYSTOLIC BLOOD PRESSURE: 146 MMHG

## 2020-02-28 VITALS — SYSTOLIC BLOOD PRESSURE: 126 MMHG

## 2020-02-28 LAB
ALBUMIN SERPL BCP-MCNC: 2.2 G/DL (ref 3.4–4.8)
ALT SERPL W P-5'-P-CCNC: 14 U/L (ref 12–78)
ANION GAP SERPL CALCULATED.3IONS-SCNC: 9 MMOL/L (ref 5–15)
AST SERPL W P-5'-P-CCNC: 14 U/L (ref 10–37)
BASOPHILS # BLD AUTO: 0 K/UL (ref 0–0.2)
BASOPHILS NFR BLD AUTO: 0.1 % (ref 0–2)
BILIRUB SERPL-MCNC: 0.5 MG/DL (ref 0–1)
BUN SERPL-MCNC: 23 MG/DL (ref 8–21)
CALCIUM SERPL-MCNC: 9 MG/DL (ref 8.4–11)
CHLORIDE SERPL-SCNC: 109 MMOL/L (ref 98–107)
CREAT SERPL-MCNC: 0.66 MG/DL (ref 0.55–1.3)
EOSINOPHIL # BLD AUTO: 0 K/UL (ref 0–0.4)
EOSINOPHIL NFR BLD AUTO: 0 % (ref 0–4)
ERYTHROCYTE [DISTWIDTH] IN BLOOD BY AUTOMATED COUNT: 14.3 % (ref 9–15)
GFR SERPL CREATININE-BSD FRML MDRD: (no result) ML/MIN (ref 90–?)
GLUCOSE SERPL-MCNC: 155 MG/DL (ref 70–99)
HCT VFR BLD AUTO: 38.4 % (ref 36–48)
HGB BLD-MCNC: 12.5 G/DL (ref 12–16)
LYMPHOCYTES # BLD AUTO: 0.3 K/UL (ref 1–5.5)
LYMPHOCYTES NFR BLD AUTO: 3.1 % (ref 20.5–51.5)
MCH RBC QN AUTO: 30 PG (ref 27–31)
MCHC RBC AUTO-ENTMCNC: 33 % (ref 32–36)
MCV RBC AUTO: 91 FL (ref 79–98)
MONOCYTES # BLD MANUAL: 0.2 K/UL (ref 0–1)
MONOCYTES # BLD MANUAL: 1.6 % (ref 1.7–9.3)
NEUTROPHILS # BLD AUTO: 10.2 K/UL (ref 1.8–7.7)
NEUTROPHILS NFR BLD AUTO: 95.2 % (ref 40–70)
PLATELET # BLD AUTO: 252 K/UL (ref 130–430)
POTASSIUM SERPL-SCNC: 3.8 MMOL/L (ref 3.5–5.1)
RBC # BLD AUTO: 4.23 MIL/UL (ref 4.2–6.2)
SODIUM SERPLBLD-SCNC: 145 MMOL/L (ref 136–145)
WBC # BLD AUTO: 10.8 K/UL (ref 4.8–10.8)

## 2020-02-28 RX ADMIN — METHYLPREDNISOLONE SODIUM SUCCINATE SCH MG: 125 INJECTION, POWDER, FOR SOLUTION INTRAMUSCULAR; INTRAVENOUS at 05:35

## 2020-02-28 RX ADMIN — ALBUTEROL SULFATE SCH MG: 2.5 SOLUTION RESPIRATORY (INHALATION) at 01:02

## 2020-02-28 RX ADMIN — ALBUTEROL SULFATE SCH MG: 2.5 SOLUTION RESPIRATORY (INHALATION) at 07:56

## 2020-02-28 RX ADMIN — PANTOPRAZOLE SODIUM SCH MG: 40 TABLET, DELAYED RELEASE ORAL at 09:00

## 2020-02-28 RX ADMIN — METHYLPREDNISOLONE SODIUM SUCCINATE SCH MG: 125 INJECTION, POWDER, FOR SOLUTION INTRAMUSCULAR; INTRAVENOUS at 15:06

## 2020-02-28 RX ADMIN — APIXABAN SCH MG: 2.5 TABLET, FILM COATED ORAL at 09:00

## 2020-02-28 RX ADMIN — APIXABAN SCH MG: 2.5 TABLET, FILM COATED ORAL at 20:27

## 2020-02-28 RX ADMIN — DOCUSATE SODIUM SCH MG: 100 CAPSULE, LIQUID FILLED ORAL at 20:26

## 2020-02-28 RX ADMIN — IPRATROPIUM BROMIDE SCH MG: 0.5 SOLUTION RESPIRATORY (INHALATION) at 13:00

## 2020-02-28 RX ADMIN — OSELTAMIVIR PHOSPHATE SCH MG: 75 CAPSULE ORAL at 09:00

## 2020-02-28 RX ADMIN — DEXTROSE AND SODIUM CHLORIDE SCH MLS/HR: 5; 450 INJECTION, SOLUTION INTRAVENOUS at 10:47

## 2020-02-28 RX ADMIN — ALBUTEROL SULFATE SCH MG: 2.5 SOLUTION RESPIRATORY (INHALATION) at 13:00

## 2020-02-28 RX ADMIN — DEXTROSE MONOHYDRATE SCH MLS/HR: 50 INJECTION, SOLUTION INTRAVENOUS at 10:25

## 2020-02-28 RX ADMIN — SODIUM CHLORIDE SCH MLS/HR: 9 INJECTION, SOLUTION INTRAVENOUS at 00:57

## 2020-02-28 RX ADMIN — OSELTAMIVIR PHOSPHATE SCH MG: 75 CAPSULE ORAL at 20:26

## 2020-02-28 RX ADMIN — DEXTROSE AND SODIUM CHLORIDE SCH MLS/HR: 5; 450 INJECTION, SOLUTION INTRAVENOUS at 22:26

## 2020-02-28 RX ADMIN — METHYLPREDNISOLONE SODIUM SUCCINATE SCH MG: 125 INJECTION, POWDER, FOR SOLUTION INTRAMUSCULAR; INTRAVENOUS at 22:26

## 2020-02-28 RX ADMIN — IPRATROPIUM BROMIDE SCH MG: 0.5 SOLUTION RESPIRATORY (INHALATION) at 07:57

## 2020-02-28 RX ADMIN — ALBUTEROL SULFATE SCH MG: 2.5 SOLUTION RESPIRATORY (INHALATION) at 19:56

## 2020-02-28 RX ADMIN — IPRATROPIUM BROMIDE SCH MG: 0.5 SOLUTION RESPIRATORY (INHALATION) at 01:02

## 2020-02-28 RX ADMIN — IPRATROPIUM BROMIDE SCH MG: 0.5 SOLUTION RESPIRATORY (INHALATION) at 19:56

## 2020-02-28 RX ADMIN — DOCUSATE SODIUM SCH MG: 100 CAPSULE, LIQUID FILLED ORAL at 09:00

## 2020-02-29 VITALS — SYSTOLIC BLOOD PRESSURE: 102 MMHG

## 2020-02-29 VITALS — SYSTOLIC BLOOD PRESSURE: 128 MMHG

## 2020-02-29 VITALS — SYSTOLIC BLOOD PRESSURE: 100 MMHG

## 2020-02-29 VITALS — SYSTOLIC BLOOD PRESSURE: 112 MMHG

## 2020-02-29 VITALS — SYSTOLIC BLOOD PRESSURE: 159 MMHG

## 2020-02-29 VITALS — SYSTOLIC BLOOD PRESSURE: 134 MMHG

## 2020-02-29 VITALS — SYSTOLIC BLOOD PRESSURE: 133 MMHG

## 2020-02-29 VITALS — SYSTOLIC BLOOD PRESSURE: 116 MMHG

## 2020-02-29 VITALS — SYSTOLIC BLOOD PRESSURE: 126 MMHG

## 2020-02-29 VITALS — SYSTOLIC BLOOD PRESSURE: 98 MMHG

## 2020-02-29 VITALS — SYSTOLIC BLOOD PRESSURE: 117 MMHG

## 2020-02-29 VITALS — SYSTOLIC BLOOD PRESSURE: 177 MMHG

## 2020-02-29 VITALS — SYSTOLIC BLOOD PRESSURE: 124 MMHG

## 2020-02-29 VITALS — SYSTOLIC BLOOD PRESSURE: 110 MMHG

## 2020-02-29 VITALS — SYSTOLIC BLOOD PRESSURE: 107 MMHG

## 2020-02-29 VITALS — SYSTOLIC BLOOD PRESSURE: 120 MMHG

## 2020-02-29 VITALS — SYSTOLIC BLOOD PRESSURE: 137 MMHG

## 2020-02-29 VITALS — SYSTOLIC BLOOD PRESSURE: 145 MMHG

## 2020-02-29 VITALS — SYSTOLIC BLOOD PRESSURE: 121 MMHG

## 2020-02-29 VITALS — SYSTOLIC BLOOD PRESSURE: 123 MMHG

## 2020-02-29 VITALS — SYSTOLIC BLOOD PRESSURE: 113 MMHG

## 2020-02-29 LAB
ALBUMIN SERPL BCP-MCNC: 2.2 G/DL (ref 3.4–4.8)
ALT SERPL W P-5'-P-CCNC: 16 U/L (ref 12–78)
ANION GAP SERPL CALCULATED.3IONS-SCNC: 7 MMOL/L (ref 5–15)
AST SERPL W P-5'-P-CCNC: 17 U/L (ref 10–37)
BASOPHILS # BLD AUTO: 0 K/UL (ref 0–0.2)
BASOPHILS NFR BLD AUTO: 0 % (ref 0–2)
BILIRUB SERPL-MCNC: 0.3 MG/DL (ref 0–1)
BUN SERPL-MCNC: 32 MG/DL (ref 8–21)
CALCIUM SERPL-MCNC: 8.9 MG/DL (ref 8.4–11)
CHLORIDE SERPL-SCNC: 107 MMOL/L (ref 98–107)
CREAT SERPL-MCNC: 0.81 MG/DL (ref 0.55–1.3)
EOSINOPHIL # BLD AUTO: 0 K/UL (ref 0–0.4)
EOSINOPHIL NFR BLD AUTO: 0 % (ref 0–4)
ERYTHROCYTE [DISTWIDTH] IN BLOOD BY AUTOMATED COUNT: 15.1 % (ref 9–15)
GFR SERPL CREATININE-BSD FRML MDRD: (no result) ML/MIN (ref 90–?)
GLUCOSE SERPL-MCNC: 202 MG/DL (ref 70–99)
HCT VFR BLD AUTO: 38.9 % (ref 36–48)
HGB BLD-MCNC: 12.2 G/DL (ref 12–16)
LYMPHOCYTES # BLD AUTO: 0.2 K/UL (ref 1–5.5)
LYMPHOCYTES NFR BLD AUTO: 1.3 % (ref 20.5–51.5)
MCH RBC QN AUTO: 29 PG (ref 27–31)
MCHC RBC AUTO-ENTMCNC: 31 % (ref 32–36)
MCV RBC AUTO: 92 FL (ref 79–98)
MONOCYTES # BLD MANUAL: 0.6 K/UL (ref 0–1)
MONOCYTES # BLD MANUAL: 4.1 % (ref 1.7–9.3)
NEUTROPHILS # BLD AUTO: 14.8 K/UL (ref 1.8–7.7)
NEUTROPHILS NFR BLD AUTO: 94.6 % (ref 40–70)
PLATELET # BLD AUTO: 353 K/UL (ref 130–430)
POTASSIUM SERPL-SCNC: 4 MMOL/L (ref 3.5–5.1)
RBC # BLD AUTO: 4.25 MIL/UL (ref 4.2–6.2)
SODIUM SERPLBLD-SCNC: 145 MMOL/L (ref 136–145)
WBC # BLD AUTO: 15.7 K/UL (ref 4.8–10.8)

## 2020-02-29 RX ADMIN — IPRATROPIUM BROMIDE SCH MG: 0.5 SOLUTION RESPIRATORY (INHALATION) at 13:43

## 2020-02-29 RX ADMIN — APIXABAN SCH MG: 2.5 TABLET, FILM COATED ORAL at 21:08

## 2020-02-29 RX ADMIN — IPRATROPIUM BROMIDE SCH MG: 0.5 SOLUTION RESPIRATORY (INHALATION) at 19:30

## 2020-02-29 RX ADMIN — DEXTROSE AND SODIUM CHLORIDE SCH MLS/HR: 5; 450 INJECTION, SOLUTION INTRAVENOUS at 15:00

## 2020-02-29 RX ADMIN — IPRATROPIUM BROMIDE SCH MG: 0.5 SOLUTION RESPIRATORY (INHALATION) at 01:11

## 2020-02-29 RX ADMIN — AMIODARONE HYDROCHLORIDE SCH MG: 200 TABLET ORAL at 21:04

## 2020-02-29 RX ADMIN — AMIODARONE HYDROCHLORIDE SCH MG: 200 TABLET ORAL at 15:00

## 2020-02-29 RX ADMIN — ALBUTEROL SULFATE SCH MG: 2.5 SOLUTION RESPIRATORY (INHALATION) at 19:30

## 2020-02-29 RX ADMIN — DOCUSATE SODIUM SCH MG: 100 CAPSULE, LIQUID FILLED ORAL at 21:03

## 2020-02-29 RX ADMIN — METHYLPREDNISOLONE SODIUM SUCCINATE SCH MG: 125 INJECTION, POWDER, FOR SOLUTION INTRAMUSCULAR; INTRAVENOUS at 21:07

## 2020-02-29 RX ADMIN — OSELTAMIVIR PHOSPHATE SCH MG: 75 CAPSULE ORAL at 08:51

## 2020-02-29 RX ADMIN — ALBUTEROL SULFATE SCH MG: 2.5 SOLUTION RESPIRATORY (INHALATION) at 13:44

## 2020-02-29 RX ADMIN — DIGOXIN ONE MG: 0.25 INJECTION INTRAMUSCULAR; INTRAVENOUS at 05:10

## 2020-02-29 RX ADMIN — ALBUTEROL SULFATE SCH MG: 2.5 SOLUTION RESPIRATORY (INHALATION) at 07:37

## 2020-02-29 RX ADMIN — DEXTROSE AND SODIUM CHLORIDE SCH MLS/HR: 5; 450 INJECTION, SOLUTION INTRAVENOUS at 23:35

## 2020-02-29 RX ADMIN — DOCUSATE SODIUM SCH MG: 100 CAPSULE, LIQUID FILLED ORAL at 08:51

## 2020-02-29 RX ADMIN — IPRATROPIUM BROMIDE SCH MG: 0.5 SOLUTION RESPIRATORY (INHALATION) at 07:37

## 2020-02-29 RX ADMIN — APIXABAN SCH MG: 2.5 TABLET, FILM COATED ORAL at 08:53

## 2020-02-29 RX ADMIN — ALBUTEROL SULFATE SCH MG: 2.5 SOLUTION RESPIRATORY (INHALATION) at 01:11

## 2020-02-29 RX ADMIN — DEXTROSE MONOHYDRATE SCH MLS/HR: 50 INJECTION, SOLUTION INTRAVENOUS at 08:49

## 2020-02-29 RX ADMIN — DIGOXIN ONE MG: 0.25 INJECTION INTRAMUSCULAR; INTRAVENOUS at 06:10

## 2020-02-29 RX ADMIN — PANTOPRAZOLE SODIUM SCH MG: 40 TABLET, DELAYED RELEASE ORAL at 08:51

## 2020-02-29 RX ADMIN — OSELTAMIVIR PHOSPHATE SCH MG: 75 CAPSULE ORAL at 21:04

## 2020-02-29 RX ADMIN — METHYLPREDNISOLONE SODIUM SUCCINATE SCH MG: 125 INJECTION, POWDER, FOR SOLUTION INTRAMUSCULAR; INTRAVENOUS at 05:28

## 2020-02-29 RX ADMIN — METHYLPREDNISOLONE SODIUM SUCCINATE SCH MG: 125 INJECTION, POWDER, FOR SOLUTION INTRAMUSCULAR; INTRAVENOUS at 14:59

## 2020-03-01 VITALS — SYSTOLIC BLOOD PRESSURE: 114 MMHG

## 2020-03-01 VITALS — SYSTOLIC BLOOD PRESSURE: 133 MMHG

## 2020-03-01 VITALS — SYSTOLIC BLOOD PRESSURE: 136 MMHG

## 2020-03-01 VITALS — SYSTOLIC BLOOD PRESSURE: 122 MMHG

## 2020-03-01 VITALS — SYSTOLIC BLOOD PRESSURE: 139 MMHG

## 2020-03-01 VITALS — SYSTOLIC BLOOD PRESSURE: 126 MMHG

## 2020-03-01 VITALS — SYSTOLIC BLOOD PRESSURE: 115 MMHG

## 2020-03-01 VITALS — SYSTOLIC BLOOD PRESSURE: 137 MMHG

## 2020-03-01 VITALS — SYSTOLIC BLOOD PRESSURE: 124 MMHG

## 2020-03-01 VITALS — SYSTOLIC BLOOD PRESSURE: 150 MMHG

## 2020-03-01 VITALS — SYSTOLIC BLOOD PRESSURE: 154 MMHG

## 2020-03-01 VITALS — SYSTOLIC BLOOD PRESSURE: 134 MMHG

## 2020-03-01 VITALS — SYSTOLIC BLOOD PRESSURE: 127 MMHG

## 2020-03-01 VITALS — SYSTOLIC BLOOD PRESSURE: 129 MMHG

## 2020-03-01 VITALS — SYSTOLIC BLOOD PRESSURE: 169 MMHG

## 2020-03-01 LAB
ANION GAP SERPL CALCULATED.3IONS-SCNC: 8 MMOL/L (ref 5–15)
BASOPHILS # BLD AUTO: 0 K/UL (ref 0–0.2)
BASOPHILS NFR BLD AUTO: 0.1 % (ref 0–2)
BUN SERPL-MCNC: 36 MG/DL (ref 8–21)
CALCIUM SERPL-MCNC: 9.4 MG/DL (ref 8.4–11)
CHLORIDE SERPL-SCNC: 107 MMOL/L (ref 98–107)
CREAT SERPL-MCNC: 0.78 MG/DL (ref 0.55–1.3)
EOSINOPHIL # BLD AUTO: 0 K/UL (ref 0–0.4)
EOSINOPHIL NFR BLD AUTO: 0 % (ref 0–4)
ERYTHROCYTE [DISTWIDTH] IN BLOOD BY AUTOMATED COUNT: 14.8 % (ref 9–15)
GFR SERPL CREATININE-BSD FRML MDRD: (no result) ML/MIN (ref 90–?)
GLUCOSE SERPL-MCNC: 157 MG/DL (ref 70–99)
HCT VFR BLD AUTO: 40 % (ref 36–48)
HGB BLD-MCNC: 12.7 G/DL (ref 12–16)
LYMPHOCYTES # BLD AUTO: 0.3 K/UL (ref 1–5.5)
LYMPHOCYTES NFR BLD AUTO: 2.4 % (ref 20.5–51.5)
MCH RBC QN AUTO: 29 PG (ref 27–31)
MCHC RBC AUTO-ENTMCNC: 32 % (ref 32–36)
MCV RBC AUTO: 91 FL (ref 79–98)
MONOCYTES # BLD MANUAL: 0.4 K/UL (ref 0–1)
MONOCYTES # BLD MANUAL: 3.3 % (ref 1.7–9.3)
NEUTROPHILS # BLD AUTO: 11.7 K/UL (ref 1.8–7.7)
NEUTROPHILS NFR BLD AUTO: 94.2 % (ref 40–70)
PLATELET # BLD AUTO: 344 K/UL (ref 130–430)
POTASSIUM SERPL-SCNC: 4.3 MMOL/L (ref 3.5–5.1)
RBC # BLD AUTO: 4.38 MIL/UL (ref 4.2–6.2)
SODIUM SERPLBLD-SCNC: 142 MMOL/L (ref 136–145)
WBC # BLD AUTO: 12.4 K/UL (ref 4.8–10.8)

## 2020-03-01 RX ADMIN — IPRATROPIUM BROMIDE SCH MG: 0.5 SOLUTION RESPIRATORY (INHALATION) at 02:17

## 2020-03-01 RX ADMIN — OSELTAMIVIR PHOSPHATE SCH MG: 75 CAPSULE ORAL at 08:51

## 2020-03-01 RX ADMIN — ALBUTEROL SULFATE SCH MG: 2.5 SOLUTION RESPIRATORY (INHALATION) at 14:17

## 2020-03-01 RX ADMIN — APIXABAN SCH MG: 2.5 TABLET, FILM COATED ORAL at 21:00

## 2020-03-01 RX ADMIN — AMIODARONE HYDROCHLORIDE SCH MG: 200 TABLET ORAL at 23:39

## 2020-03-01 RX ADMIN — APIXABAN SCH MG: 2.5 TABLET, FILM COATED ORAL at 08:51

## 2020-03-01 RX ADMIN — PANTOPRAZOLE SODIUM SCH MG: 40 TABLET, DELAYED RELEASE ORAL at 08:51

## 2020-03-01 RX ADMIN — ALBUTEROL SULFATE SCH MG: 2.5 SOLUTION RESPIRATORY (INHALATION) at 14:18

## 2020-03-01 RX ADMIN — OSELTAMIVIR PHOSPHATE SCH MG: 75 CAPSULE ORAL at 21:00

## 2020-03-01 RX ADMIN — ALBUTEROL SULFATE SCH MG: 2.5 SOLUTION RESPIRATORY (INHALATION) at 19:59

## 2020-03-01 RX ADMIN — DEXTROSE MONOHYDRATE SCH MLS/HR: 50 INJECTION, SOLUTION INTRAVENOUS at 08:50

## 2020-03-01 RX ADMIN — Medication SCH MG: at 21:00

## 2020-03-01 RX ADMIN — DOCUSATE SODIUM SCH MG: 100 CAPSULE, LIQUID FILLED ORAL at 08:51

## 2020-03-01 RX ADMIN — IPRATROPIUM BROMIDE SCH MG: 0.5 SOLUTION RESPIRATORY (INHALATION) at 19:59

## 2020-03-01 RX ADMIN — ALBUTEROL SULFATE SCH MG: 2.5 SOLUTION RESPIRATORY (INHALATION) at 02:17

## 2020-03-01 RX ADMIN — AMIODARONE HYDROCHLORIDE SCH MG: 200 TABLET ORAL at 13:19

## 2020-03-01 RX ADMIN — IPRATROPIUM BROMIDE SCH MG: 0.5 SOLUTION RESPIRATORY (INHALATION) at 14:18

## 2020-03-01 RX ADMIN — AMIODARONE HYDROCHLORIDE SCH MG: 200 TABLET ORAL at 05:28

## 2020-03-01 RX ADMIN — IPRATROPIUM BROMIDE SCH MG: 0.5 SOLUTION RESPIRATORY (INHALATION) at 14:17

## 2020-03-01 RX ADMIN — METHYLPREDNISOLONE SODIUM SUCCINATE SCH MG: 125 INJECTION, POWDER, FOR SOLUTION INTRAMUSCULAR; INTRAVENOUS at 05:27

## 2020-03-01 RX ADMIN — DOCUSATE SODIUM SCH MG: 100 CAPSULE, LIQUID FILLED ORAL at 23:39

## 2020-03-02 VITALS — SYSTOLIC BLOOD PRESSURE: 112 MMHG

## 2020-03-02 VITALS — SYSTOLIC BLOOD PRESSURE: 148 MMHG

## 2020-03-02 VITALS — SYSTOLIC BLOOD PRESSURE: 137 MMHG

## 2020-03-02 VITALS — SYSTOLIC BLOOD PRESSURE: 149 MMHG

## 2020-03-02 VITALS — SYSTOLIC BLOOD PRESSURE: 171 MMHG

## 2020-03-02 VITALS — SYSTOLIC BLOOD PRESSURE: 146 MMHG

## 2020-03-02 VITALS — SYSTOLIC BLOOD PRESSURE: 136 MMHG

## 2020-03-02 VITALS — SYSTOLIC BLOOD PRESSURE: 168 MMHG

## 2020-03-02 VITALS — SYSTOLIC BLOOD PRESSURE: 138 MMHG

## 2020-03-02 VITALS — SYSTOLIC BLOOD PRESSURE: 155 MMHG

## 2020-03-02 VITALS — SYSTOLIC BLOOD PRESSURE: 134 MMHG

## 2020-03-02 VITALS — SYSTOLIC BLOOD PRESSURE: 180 MMHG

## 2020-03-02 VITALS — SYSTOLIC BLOOD PRESSURE: 135 MMHG

## 2020-03-02 VITALS — SYSTOLIC BLOOD PRESSURE: 128 MMHG

## 2020-03-02 VITALS — SYSTOLIC BLOOD PRESSURE: 189 MMHG

## 2020-03-02 VITALS — SYSTOLIC BLOOD PRESSURE: 162 MMHG

## 2020-03-02 VITALS — SYSTOLIC BLOOD PRESSURE: 132 MMHG

## 2020-03-02 LAB
BASOPHILS # BLD AUTO: 0 K/UL (ref 0–0.2)
BASOPHILS NFR BLD AUTO: 0 % (ref 0–2)
EOSINOPHIL # BLD AUTO: 0 K/UL (ref 0–0.4)
EOSINOPHIL NFR BLD AUTO: 0 % (ref 0–4)
ERYTHROCYTE [DISTWIDTH] IN BLOOD BY AUTOMATED COUNT: 14.3 % (ref 9–15)
HCT VFR BLD AUTO: 38.9 % (ref 36–48)
HGB BLD-MCNC: 12.6 G/DL (ref 12–16)
LYMPHOCYTES # BLD AUTO: 0.4 K/UL (ref 1–5.5)
LYMPHOCYTES NFR BLD AUTO: 3.8 % (ref 20.5–51.5)
MCH RBC QN AUTO: 29 PG (ref 27–31)
MCHC RBC AUTO-ENTMCNC: 33 % (ref 32–36)
MCV RBC AUTO: 90 FL (ref 79–98)
MONOCYTES # BLD MANUAL: 0.9 K/UL (ref 0–1)
MONOCYTES # BLD MANUAL: 9.2 % (ref 1.7–9.3)
NEUTROPHILS # BLD AUTO: 8.9 K/UL (ref 1.8–7.7)
NEUTROPHILS NFR BLD AUTO: 87 % (ref 40–70)
PLATELET # BLD AUTO: 392 K/UL (ref 130–430)
RBC # BLD AUTO: 4.32 MIL/UL (ref 4.2–6.2)
WBC # BLD AUTO: 10.2 K/UL (ref 4.8–10.8)

## 2020-03-02 RX ADMIN — PANTOPRAZOLE SODIUM SCH MG: 40 TABLET, DELAYED RELEASE ORAL at 08:15

## 2020-03-02 RX ADMIN — DOCUSATE SODIUM SCH MG: 100 CAPSULE, LIQUID FILLED ORAL at 22:24

## 2020-03-02 RX ADMIN — DEXTROSE MONOHYDRATE SCH MLS/HR: 50 INJECTION, SOLUTION INTRAVENOUS at 08:15

## 2020-03-02 RX ADMIN — OSELTAMIVIR PHOSPHATE SCH MG: 75 CAPSULE ORAL at 22:23

## 2020-03-02 RX ADMIN — IPRATROPIUM BROMIDE SCH MG: 0.5 SOLUTION RESPIRATORY (INHALATION) at 01:45

## 2020-03-02 RX ADMIN — ALBUTEROL SULFATE SCH MG: 2.5 SOLUTION RESPIRATORY (INHALATION) at 07:00

## 2020-03-02 RX ADMIN — IPRATROPIUM BROMIDE SCH MG: 0.5 SOLUTION RESPIRATORY (INHALATION) at 07:00

## 2020-03-02 RX ADMIN — Medication SCH MG: at 08:16

## 2020-03-02 RX ADMIN — AMIODARONE HYDROCHLORIDE SCH MG: 200 TABLET ORAL at 12:57

## 2020-03-02 RX ADMIN — ALBUTEROL SULFATE SCH MG: 2.5 SOLUTION RESPIRATORY (INHALATION) at 13:00

## 2020-03-02 RX ADMIN — Medication SCH MG: at 08:17

## 2020-03-02 RX ADMIN — ACETAMINOPHEN PRN MG: 650 SOLUTION ORAL at 17:38

## 2020-03-02 RX ADMIN — ALBUTEROL SULFATE SCH MG: 2.5 SOLUTION RESPIRATORY (INHALATION) at 01:45

## 2020-03-02 RX ADMIN — DOCUSATE SODIUM SCH MG: 100 CAPSULE, LIQUID FILLED ORAL at 08:16

## 2020-03-02 RX ADMIN — AMIODARONE HYDROCHLORIDE SCH MG: 200 TABLET ORAL at 22:44

## 2020-03-02 RX ADMIN — Medication SCH MG: at 22:27

## 2020-03-02 RX ADMIN — ALBUTEROL SULFATE SCH MG: 2.5 SOLUTION RESPIRATORY (INHALATION) at 20:07

## 2020-03-02 RX ADMIN — IPRATROPIUM BROMIDE SCH MG: 0.5 SOLUTION RESPIRATORY (INHALATION) at 13:00

## 2020-03-02 RX ADMIN — Medication SCH MG: at 22:26

## 2020-03-02 RX ADMIN — APIXABAN SCH MG: 2.5 TABLET, FILM COATED ORAL at 08:16

## 2020-03-02 RX ADMIN — APIXABAN SCH MG: 2.5 TABLET, FILM COATED ORAL at 22:27

## 2020-03-02 RX ADMIN — IPRATROPIUM BROMIDE SCH MG: 0.5 SOLUTION RESPIRATORY (INHALATION) at 20:07

## 2020-03-02 RX ADMIN — AMIODARONE HYDROCHLORIDE SCH MG: 200 TABLET ORAL at 05:57

## 2020-03-02 RX ADMIN — OSELTAMIVIR PHOSPHATE SCH MG: 75 CAPSULE ORAL at 08:16

## 2020-03-02 RX ADMIN — DEXTROSE AND SODIUM CHLORIDE SCH MLS/HR: 5; 450 INJECTION, SOLUTION INTRAVENOUS at 05:58

## 2020-03-03 VITALS — SYSTOLIC BLOOD PRESSURE: 133 MMHG

## 2020-03-03 VITALS — SYSTOLIC BLOOD PRESSURE: 158 MMHG

## 2020-03-03 VITALS — SYSTOLIC BLOOD PRESSURE: 119 MMHG

## 2020-03-03 VITALS — SYSTOLIC BLOOD PRESSURE: 102 MMHG

## 2020-03-03 VITALS — SYSTOLIC BLOOD PRESSURE: 125 MMHG

## 2020-03-03 VITALS — SYSTOLIC BLOOD PRESSURE: 154 MMHG

## 2020-03-03 VITALS — SYSTOLIC BLOOD PRESSURE: 143 MMHG

## 2020-03-03 VITALS — SYSTOLIC BLOOD PRESSURE: 179 MMHG

## 2020-03-03 VITALS — SYSTOLIC BLOOD PRESSURE: 101 MMHG

## 2020-03-03 VITALS — SYSTOLIC BLOOD PRESSURE: 153 MMHG

## 2020-03-03 VITALS — SYSTOLIC BLOOD PRESSURE: 167 MMHG

## 2020-03-03 VITALS — SYSTOLIC BLOOD PRESSURE: 156 MMHG

## 2020-03-03 VITALS — SYSTOLIC BLOOD PRESSURE: 106 MMHG

## 2020-03-03 VITALS — SYSTOLIC BLOOD PRESSURE: 136 MMHG

## 2020-03-03 VITALS — SYSTOLIC BLOOD PRESSURE: 169 MMHG

## 2020-03-03 VITALS — SYSTOLIC BLOOD PRESSURE: 114 MMHG

## 2020-03-03 VITALS — SYSTOLIC BLOOD PRESSURE: 111 MMHG

## 2020-03-03 VITALS — SYSTOLIC BLOOD PRESSURE: 127 MMHG

## 2020-03-03 LAB
ALBUMIN SERPL BCP-MCNC: 2.2 G/DL (ref 3.4–4.8)
ALT SERPL W P-5'-P-CCNC: 14 U/L (ref 12–78)
ANION GAP SERPL CALCULATED.3IONS-SCNC: 2 MMOL/L (ref 5–15)
AST SERPL W P-5'-P-CCNC: 10 U/L (ref 10–37)
BASOPHILS # BLD AUTO: 0 K/UL (ref 0–0.2)
BASOPHILS NFR BLD AUTO: 0.2 % (ref 0–2)
BILIRUB SERPL-MCNC: 0.3 MG/DL (ref 0–1)
BUN SERPL-MCNC: 39 MG/DL (ref 8–21)
CALCIUM SERPL-MCNC: 9.5 MG/DL (ref 8.4–11)
CHLORIDE SERPL-SCNC: 105 MMOL/L (ref 98–107)
CREAT SERPL-MCNC: 0.85 MG/DL (ref 0.55–1.3)
EOSINOPHIL # BLD AUTO: 0 K/UL (ref 0–0.4)
EOSINOPHIL NFR BLD AUTO: 0 % (ref 0–4)
ERYTHROCYTE [DISTWIDTH] IN BLOOD BY AUTOMATED COUNT: 14.7 % (ref 9–15)
GFR SERPL CREATININE-BSD FRML MDRD: (no result) ML/MIN (ref 90–?)
GLUCOSE SERPL-MCNC: 150 MG/DL (ref 70–99)
HCT VFR BLD AUTO: 39.1 % (ref 36–48)
HGB BLD-MCNC: 12.6 G/DL (ref 12–16)
LYMPHOCYTES # BLD AUTO: 0.4 K/UL (ref 1–5.5)
LYMPHOCYTES NFR BLD AUTO: 3.9 % (ref 20.5–51.5)
MCH RBC QN AUTO: 29 PG (ref 27–31)
MCHC RBC AUTO-ENTMCNC: 32 % (ref 32–36)
MCV RBC AUTO: 90 FL (ref 79–98)
MONOCYTES # BLD MANUAL: 0.9 K/UL (ref 0–1)
MONOCYTES # BLD MANUAL: 8.7 % (ref 1.7–9.3)
NEUTROPHILS # BLD AUTO: 8.7 K/UL (ref 1.8–7.7)
NEUTROPHILS NFR BLD AUTO: 87.2 % (ref 40–70)
PLATELET # BLD AUTO: 372 K/UL (ref 130–430)
POTASSIUM SERPL-SCNC: 5.8 MMOL/L (ref 3.5–5.1)
RBC # BLD AUTO: 4.33 MIL/UL (ref 4.2–6.2)
SODIUM SERPLBLD-SCNC: 141 MMOL/L (ref 136–145)
WBC # BLD AUTO: 9.9 K/UL (ref 4.8–10.8)

## 2020-03-03 RX ADMIN — Medication SCH MG: at 09:00

## 2020-03-03 RX ADMIN — ACETAMINOPHEN PRN MG: 650 SOLUTION ORAL at 18:31

## 2020-03-03 RX ADMIN — OSELTAMIVIR PHOSPHATE SCH MG: 75 CAPSULE ORAL at 08:59

## 2020-03-03 RX ADMIN — AMIODARONE HYDROCHLORIDE SCH MG: 200 TABLET ORAL at 08:36

## 2020-03-03 RX ADMIN — ALBUTEROL SULFATE SCH MG: 2.5 SOLUTION RESPIRATORY (INHALATION) at 19:32

## 2020-03-03 RX ADMIN — IPRATROPIUM BROMIDE SCH MG: 0.5 SOLUTION RESPIRATORY (INHALATION) at 19:32

## 2020-03-03 RX ADMIN — ALBUTEROL SULFATE SCH MG: 2.5 SOLUTION RESPIRATORY (INHALATION) at 07:00

## 2020-03-03 RX ADMIN — DOCUSATE SODIUM SCH MG: 100 CAPSULE, LIQUID FILLED ORAL at 09:00

## 2020-03-03 RX ADMIN — APIXABAN SCH MG: 2.5 TABLET, FILM COATED ORAL at 21:26

## 2020-03-03 RX ADMIN — APIXABAN SCH MG: 2.5 TABLET, FILM COATED ORAL at 09:02

## 2020-03-03 RX ADMIN — DILTIAZEM HYDROCHLORIDE SCH MG: 60 TABLET, FILM COATED ORAL at 09:00

## 2020-03-03 RX ADMIN — DILTIAZEM HYDROCHLORIDE SCH MG: 60 TABLET, FILM COATED ORAL at 14:08

## 2020-03-03 RX ADMIN — IPRATROPIUM BROMIDE SCH MG: 0.5 SOLUTION RESPIRATORY (INHALATION) at 13:43

## 2020-03-03 RX ADMIN — ACETAMINOPHEN PRN MG: 650 SOLUTION ORAL at 21:27

## 2020-03-03 RX ADMIN — DEXTROSE AND SODIUM CHLORIDE SCH MLS/HR: 5; 450 INJECTION, SOLUTION INTRAVENOUS at 09:03

## 2020-03-03 RX ADMIN — ALBUTEROL SULFATE SCH MG: 2.5 SOLUTION RESPIRATORY (INHALATION) at 13:43

## 2020-03-03 RX ADMIN — DEXTROSE MONOHYDRATE SCH MLS/HR: 50 INJECTION, SOLUTION INTRAVENOUS at 08:59

## 2020-03-03 RX ADMIN — AMIODARONE HYDROCHLORIDE SCH MG: 200 TABLET ORAL at 07:09

## 2020-03-03 RX ADMIN — DOCUSATE SODIUM SCH MG: 100 CAPSULE, LIQUID FILLED ORAL at 21:27

## 2020-03-03 RX ADMIN — IPRATROPIUM BROMIDE SCH MG: 0.5 SOLUTION RESPIRATORY (INHALATION) at 07:00

## 2020-03-03 RX ADMIN — ALBUTEROL SULFATE SCH MG: 2.5 SOLUTION RESPIRATORY (INHALATION) at 07:05

## 2020-03-03 RX ADMIN — Medication SCH MG: at 21:00

## 2020-03-03 RX ADMIN — IPRATROPIUM BROMIDE SCH MG: 0.5 SOLUTION RESPIRATORY (INHALATION) at 07:06

## 2020-03-03 RX ADMIN — DILTIAZEM HYDROCHLORIDE SCH MG: 60 TABLET, FILM COATED ORAL at 21:23

## 2020-03-03 RX ADMIN — IPRATROPIUM BROMIDE SCH MG: 0.5 SOLUTION RESPIRATORY (INHALATION) at 00:59

## 2020-03-03 RX ADMIN — PREDNISONE SCH MG: 10 TABLET ORAL at 21:24

## 2020-03-03 RX ADMIN — ALBUTEROL SULFATE SCH MG: 2.5 SOLUTION RESPIRATORY (INHALATION) at 00:59

## 2020-03-03 RX ADMIN — PANTOPRAZOLE SODIUM SCH MG: 40 TABLET, DELAYED RELEASE ORAL at 09:00

## 2020-03-04 VITALS — SYSTOLIC BLOOD PRESSURE: 99 MMHG

## 2020-03-04 VITALS — SYSTOLIC BLOOD PRESSURE: 112 MMHG

## 2020-03-04 VITALS — SYSTOLIC BLOOD PRESSURE: 116 MMHG

## 2020-03-04 VITALS — SYSTOLIC BLOOD PRESSURE: 111 MMHG

## 2020-03-04 VITALS — SYSTOLIC BLOOD PRESSURE: 108 MMHG

## 2020-03-04 VITALS — SYSTOLIC BLOOD PRESSURE: 103 MMHG

## 2020-03-04 VITALS — SYSTOLIC BLOOD PRESSURE: 107 MMHG

## 2020-03-04 VITALS — SYSTOLIC BLOOD PRESSURE: 97 MMHG

## 2020-03-04 VITALS — SYSTOLIC BLOOD PRESSURE: 121 MMHG

## 2020-03-04 VITALS — SYSTOLIC BLOOD PRESSURE: 106 MMHG

## 2020-03-04 VITALS — SYSTOLIC BLOOD PRESSURE: 152 MMHG

## 2020-03-04 LAB
ALBUMIN SERPL BCP-MCNC: 2.2 G/DL (ref 3.4–4.8)
ALT SERPL W P-5'-P-CCNC: 14 U/L (ref 12–78)
ANION GAP SERPL CALCULATED.3IONS-SCNC: < 3 MMOL/L (ref 5–15)
AST SERPL W P-5'-P-CCNC: 13 U/L (ref 10–37)
BASOPHILS # BLD AUTO: 0.1 K/UL (ref 0–0.2)
BASOPHILS NFR BLD AUTO: 0.5 % (ref 0–2)
BILIRUB SERPL-MCNC: 0.4 MG/DL (ref 0–1)
BUN SERPL-MCNC: 30 MG/DL (ref 8–21)
CALCIUM SERPL-MCNC: 8.9 MG/DL (ref 8.4–11)
CHLORIDE SERPL-SCNC: 102 MMOL/L (ref 98–107)
CREAT SERPL-MCNC: 0.64 MG/DL (ref 0.55–1.3)
EOSINOPHIL # BLD AUTO: 0 K/UL (ref 0–0.4)
EOSINOPHIL NFR BLD AUTO: 0 % (ref 0–4)
ERYTHROCYTE [DISTWIDTH] IN BLOOD BY AUTOMATED COUNT: 14.6 % (ref 9–15)
GFR SERPL CREATININE-BSD FRML MDRD: (no result) ML/MIN (ref 90–?)
GLUCOSE SERPL-MCNC: 127 MG/DL (ref 70–99)
HCT VFR BLD AUTO: 40.9 % (ref 36–48)
HGB BLD-MCNC: 12.9 G/DL (ref 12–16)
LYMPHOCYTES # BLD AUTO: 0.4 K/UL (ref 1–5.5)
LYMPHOCYTES NFR BLD AUTO: 3.4 % (ref 20.5–51.5)
MCH RBC QN AUTO: 29 PG (ref 27–31)
MCHC RBC AUTO-ENTMCNC: 32 % (ref 32–36)
MCV RBC AUTO: 91 FL (ref 79–98)
MONOCYTES # BLD MANUAL: 0.7 K/UL (ref 0–1)
MONOCYTES # BLD MANUAL: 5.2 % (ref 1.7–9.3)
NEUTROPHILS # BLD AUTO: 11.6 K/UL (ref 1.8–7.7)
NEUTROPHILS NFR BLD AUTO: 90.9 % (ref 40–70)
PLATELET # BLD AUTO: 398 K/UL (ref 130–430)
POTASSIUM SERPL-SCNC: 4.7 MMOL/L (ref 3.5–5.1)
RBC # BLD AUTO: 4.49 MIL/UL (ref 4.2–6.2)
SODIUM SERPLBLD-SCNC: 133 MMOL/L (ref 136–145)
WBC # BLD AUTO: 12.8 K/UL (ref 4.8–10.8)

## 2020-03-04 RX ADMIN — DEXTROSE AND SODIUM CHLORIDE SCH MLS/HR: 5; 450 INJECTION, SOLUTION INTRAVENOUS at 10:41

## 2020-03-04 RX ADMIN — IPRATROPIUM BROMIDE SCH MG: 0.5 SOLUTION RESPIRATORY (INHALATION) at 01:23

## 2020-03-04 RX ADMIN — DILTIAZEM HYDROCHLORIDE SCH MG: 60 TABLET, FILM COATED ORAL at 21:26

## 2020-03-04 RX ADMIN — CARBIDOPA AND LEVODOPA SCH TAB: 10; 100 TABLET ORAL at 18:09

## 2020-03-04 RX ADMIN — IPRATROPIUM BROMIDE SCH MG: 0.5 SOLUTION RESPIRATORY (INHALATION) at 07:30

## 2020-03-04 RX ADMIN — ALBUTEROL SULFATE SCH MG: 2.5 SOLUTION RESPIRATORY (INHALATION) at 13:52

## 2020-03-04 RX ADMIN — CARBIDOPA AND LEVODOPA SCH TAB: 10; 100 TABLET ORAL at 21:25

## 2020-03-04 RX ADMIN — ALBUTEROL SULFATE SCH MG: 2.5 SOLUTION RESPIRATORY (INHALATION) at 19:25

## 2020-03-04 RX ADMIN — Medication SCH MG: at 10:37

## 2020-03-04 RX ADMIN — CARBIDOPA AND LEVODOPA SCH TAB: 10; 100 TABLET ORAL at 12:48

## 2020-03-04 RX ADMIN — DILTIAZEM HYDROCHLORIDE SCH MG: 60 TABLET, FILM COATED ORAL at 05:33

## 2020-03-04 RX ADMIN — Medication SCH MG: at 21:25

## 2020-03-04 RX ADMIN — DEXTROSE MONOHYDRATE SCH MLS/HR: 50 INJECTION, SOLUTION INTRAVENOUS at 10:32

## 2020-03-04 RX ADMIN — DILTIAZEM HYDROCHLORIDE SCH MG: 60 TABLET, FILM COATED ORAL at 12:47

## 2020-03-04 RX ADMIN — PREDNISONE SCH MG: 10 TABLET ORAL at 10:37

## 2020-03-04 RX ADMIN — DOCUSATE SODIUM SCH MG: 100 CAPSULE, LIQUID FILLED ORAL at 10:33

## 2020-03-04 RX ADMIN — ALBUTEROL SULFATE SCH MG: 2.5 SOLUTION RESPIRATORY (INHALATION) at 07:30

## 2020-03-04 RX ADMIN — PREDNISONE SCH MG: 10 TABLET ORAL at 21:25

## 2020-03-04 RX ADMIN — PANTOPRAZOLE SODIUM SCH MG: 40 TABLET, DELAYED RELEASE ORAL at 10:37

## 2020-03-04 RX ADMIN — AMIODARONE HYDROCHLORIDE SCH MG: 200 TABLET ORAL at 10:36

## 2020-03-04 RX ADMIN — IPRATROPIUM BROMIDE SCH MG: 0.5 SOLUTION RESPIRATORY (INHALATION) at 19:25

## 2020-03-04 RX ADMIN — APIXABAN SCH MG: 2.5 TABLET, FILM COATED ORAL at 10:33

## 2020-03-04 RX ADMIN — ALBUTEROL SULFATE SCH MG: 2.5 SOLUTION RESPIRATORY (INHALATION) at 01:22

## 2020-03-04 RX ADMIN — APIXABAN SCH MG: 2.5 TABLET, FILM COATED ORAL at 21:29

## 2020-03-04 RX ADMIN — DOCUSATE SODIUM SCH MG: 100 CAPSULE, LIQUID FILLED ORAL at 21:24

## 2020-03-04 RX ADMIN — IPRATROPIUM BROMIDE SCH MG: 0.5 SOLUTION RESPIRATORY (INHALATION) at 13:52

## 2020-03-05 VITALS — SYSTOLIC BLOOD PRESSURE: 120 MMHG

## 2020-03-05 VITALS — SYSTOLIC BLOOD PRESSURE: 128 MMHG

## 2020-03-05 VITALS — SYSTOLIC BLOOD PRESSURE: 106 MMHG

## 2020-03-05 VITALS — SYSTOLIC BLOOD PRESSURE: 123 MMHG

## 2020-03-05 VITALS — SYSTOLIC BLOOD PRESSURE: 94 MMHG

## 2020-03-05 VITALS — SYSTOLIC BLOOD PRESSURE: 111 MMHG

## 2020-03-05 LAB
ANION GAP SERPL CALCULATED.3IONS-SCNC: 3 MMOL/L (ref 5–15)
BASOPHILS # BLD AUTO: 0 K/UL (ref 0–0.2)
BASOPHILS NFR BLD AUTO: 0.1 % (ref 0–2)
BUN SERPL-MCNC: 33 MG/DL (ref 8–21)
CALCIUM SERPL-MCNC: 8.8 MG/DL (ref 8.4–11)
CHLORIDE SERPL-SCNC: 102 MMOL/L (ref 98–107)
CREAT SERPL-MCNC: 0.72 MG/DL (ref 0.55–1.3)
EOSINOPHIL # BLD AUTO: 0.1 K/UL (ref 0–0.4)
EOSINOPHIL NFR BLD AUTO: 0.4 % (ref 0–4)
ERYTHROCYTE [DISTWIDTH] IN BLOOD BY AUTOMATED COUNT: 14.6 % (ref 9–15)
GFR SERPL CREATININE-BSD FRML MDRD: (no result) ML/MIN (ref 90–?)
GLUCOSE SERPL-MCNC: 124 MG/DL (ref 70–99)
HCT VFR BLD AUTO: 40.1 % (ref 36–48)
HGB BLD-MCNC: 12.9 G/DL (ref 12–16)
LYMPHOCYTES # BLD AUTO: 0.4 K/UL (ref 1–5.5)
LYMPHOCYTES NFR BLD AUTO: 3.2 % (ref 20.5–51.5)
MCH RBC QN AUTO: 29 PG (ref 27–31)
MCHC RBC AUTO-ENTMCNC: 32 % (ref 32–36)
MCV RBC AUTO: 90 FL (ref 79–98)
MONOCYTES # BLD MANUAL: 0.7 K/UL (ref 0–1)
MONOCYTES # BLD MANUAL: 5 % (ref 1.7–9.3)
NEUTROPHILS # BLD AUTO: 12.8 K/UL (ref 1.8–7.7)
NEUTROPHILS NFR BLD AUTO: 91.3 % (ref 40–70)
PLATELET # BLD AUTO: 384 K/UL (ref 130–430)
POTASSIUM SERPL-SCNC: 4.7 MMOL/L (ref 3.5–5.1)
RBC # BLD AUTO: 4.46 MIL/UL (ref 4.2–6.2)
SODIUM SERPLBLD-SCNC: 138 MMOL/L (ref 136–145)
WBC # BLD AUTO: 14 K/UL (ref 4.8–10.8)

## 2020-03-05 RX ADMIN — DOCUSATE SODIUM SCH MG: 100 CAPSULE, LIQUID FILLED ORAL at 08:53

## 2020-03-05 RX ADMIN — IPRATROPIUM BROMIDE SCH MG: 0.5 SOLUTION RESPIRATORY (INHALATION) at 08:24

## 2020-03-05 RX ADMIN — DILTIAZEM HYDROCHLORIDE SCH MG: 60 TABLET, FILM COATED ORAL at 06:25

## 2020-03-05 RX ADMIN — AMIODARONE HYDROCHLORIDE SCH MG: 200 TABLET ORAL at 08:53

## 2020-03-05 RX ADMIN — CARBIDOPA AND LEVODOPA SCH TAB: 10; 100 TABLET ORAL at 17:53

## 2020-03-05 RX ADMIN — PREDNISONE SCH MG: 10 TABLET ORAL at 08:54

## 2020-03-05 RX ADMIN — IPRATROPIUM BROMIDE SCH MG: 0.5 SOLUTION RESPIRATORY (INHALATION) at 19:00

## 2020-03-05 RX ADMIN — CARBIDOPA AND LEVODOPA SCH TAB: 10; 100 TABLET ORAL at 21:34

## 2020-03-05 RX ADMIN — DEXTROSE AND SODIUM CHLORIDE SCH MLS/HR: 5; 450 INJECTION, SOLUTION INTRAVENOUS at 09:13

## 2020-03-05 RX ADMIN — APIXABAN SCH MG: 2.5 TABLET, FILM COATED ORAL at 21:37

## 2020-03-05 RX ADMIN — DILTIAZEM HYDROCHLORIDE SCH MG: 60 TABLET, FILM COATED ORAL at 14:00

## 2020-03-05 RX ADMIN — ALBUTEROL SULFATE SCH MG: 2.5 SOLUTION RESPIRATORY (INHALATION) at 19:00

## 2020-03-05 RX ADMIN — PANTOPRAZOLE SODIUM SCH MG: 40 TABLET, DELAYED RELEASE ORAL at 08:53

## 2020-03-05 RX ADMIN — IPRATROPIUM BROMIDE SCH MG: 0.5 SOLUTION RESPIRATORY (INHALATION) at 02:10

## 2020-03-05 RX ADMIN — DEXTROSE MONOHYDRATE SCH MLS/HR: 50 INJECTION, SOLUTION INTRAVENOUS at 08:55

## 2020-03-05 RX ADMIN — IPRATROPIUM BROMIDE SCH MG: 0.5 SOLUTION RESPIRATORY (INHALATION) at 13:36

## 2020-03-05 RX ADMIN — ALBUTEROL SULFATE SCH MG: 2.5 SOLUTION RESPIRATORY (INHALATION) at 13:37

## 2020-03-05 RX ADMIN — ALBUTEROL SULFATE SCH MG: 2.5 SOLUTION RESPIRATORY (INHALATION) at 08:24

## 2020-03-05 RX ADMIN — Medication SCH MG: at 21:00

## 2020-03-05 RX ADMIN — CARBIDOPA AND LEVODOPA SCH TAB: 10; 100 TABLET ORAL at 08:53

## 2020-03-05 RX ADMIN — CARBIDOPA AND LEVODOPA SCH TAB: 10; 100 TABLET ORAL at 13:18

## 2020-03-05 RX ADMIN — APIXABAN SCH MG: 2.5 TABLET, FILM COATED ORAL at 08:55

## 2020-03-05 RX ADMIN — ALBUTEROL SULFATE SCH MG: 2.5 SOLUTION RESPIRATORY (INHALATION) at 02:10

## 2020-03-05 RX ADMIN — DOCUSATE SODIUM SCH MG: 100 CAPSULE, LIQUID FILLED ORAL at 21:34

## 2020-03-05 RX ADMIN — Medication SCH MG: at 08:54

## 2020-03-06 VITALS — SYSTOLIC BLOOD PRESSURE: 126 MMHG

## 2020-03-06 VITALS — SYSTOLIC BLOOD PRESSURE: 119 MMHG

## 2020-03-06 VITALS — SYSTOLIC BLOOD PRESSURE: 136 MMHG

## 2020-03-06 VITALS — SYSTOLIC BLOOD PRESSURE: 102 MMHG

## 2020-03-06 VITALS — SYSTOLIC BLOOD PRESSURE: 109 MMHG

## 2020-03-06 VITALS — SYSTOLIC BLOOD PRESSURE: 148 MMHG

## 2020-03-06 RX ADMIN — DEXTROSE AND SODIUM CHLORIDE SCH MLS/HR: 5; 450 INJECTION, SOLUTION INTRAVENOUS at 08:45

## 2020-03-06 RX ADMIN — DILTIAZEM HYDROCHLORIDE SCH MG: 60 TABLET, FILM COATED ORAL at 13:46

## 2020-03-06 RX ADMIN — Medication SCH MG: at 21:58

## 2020-03-06 RX ADMIN — ALBUTEROL SULFATE SCH MG: 2.5 SOLUTION RESPIRATORY (INHALATION) at 13:34

## 2020-03-06 RX ADMIN — DILTIAZEM HYDROCHLORIDE SCH MG: 60 TABLET, FILM COATED ORAL at 01:00

## 2020-03-06 RX ADMIN — AMIODARONE HYDROCHLORIDE SCH MG: 200 TABLET ORAL at 08:56

## 2020-03-06 RX ADMIN — ALBUTEROL SULFATE SCH MG: 2.5 SOLUTION RESPIRATORY (INHALATION) at 07:13

## 2020-03-06 RX ADMIN — DOCUSATE SODIUM SCH MG: 100 CAPSULE, LIQUID FILLED ORAL at 21:57

## 2020-03-06 RX ADMIN — PANTOPRAZOLE SODIUM SCH MG: 40 TABLET, DELAYED RELEASE ORAL at 08:56

## 2020-03-06 RX ADMIN — APIXABAN SCH MG: 2.5 TABLET, FILM COATED ORAL at 21:58

## 2020-03-06 RX ADMIN — PREDNISONE SCH MG: 10 TABLET ORAL at 08:53

## 2020-03-06 RX ADMIN — DILTIAZEM HYDROCHLORIDE SCH MG: 60 TABLET, FILM COATED ORAL at 05:53

## 2020-03-06 RX ADMIN — ALBUTEROL SULFATE SCH MG: 2.5 SOLUTION RESPIRATORY (INHALATION) at 19:58

## 2020-03-06 RX ADMIN — ALBUTEROL SULFATE SCH MG: 2.5 SOLUTION RESPIRATORY (INHALATION) at 01:35

## 2020-03-06 RX ADMIN — IPRATROPIUM BROMIDE SCH MG: 0.5 SOLUTION RESPIRATORY (INHALATION) at 01:35

## 2020-03-06 RX ADMIN — IPRATROPIUM BROMIDE SCH MG: 0.5 SOLUTION RESPIRATORY (INHALATION) at 19:58

## 2020-03-06 RX ADMIN — APIXABAN SCH MG: 2.5 TABLET, FILM COATED ORAL at 08:53

## 2020-03-06 RX ADMIN — DEXTROSE MONOHYDRATE SCH MLS/HR: 50 INJECTION, SOLUTION INTRAVENOUS at 08:51

## 2020-03-06 RX ADMIN — IPRATROPIUM BROMIDE SCH MG: 0.5 SOLUTION RESPIRATORY (INHALATION) at 07:14

## 2020-03-06 RX ADMIN — CARBIDOPA AND LEVODOPA SCH TAB: 10; 100 TABLET ORAL at 16:54

## 2020-03-06 RX ADMIN — DILTIAZEM HYDROCHLORIDE SCH MG: 60 TABLET, FILM COATED ORAL at 21:57

## 2020-03-06 RX ADMIN — CARBIDOPA AND LEVODOPA SCH TAB: 10; 100 TABLET ORAL at 21:57

## 2020-03-06 RX ADMIN — CARBIDOPA AND LEVODOPA SCH TAB: 10; 100 TABLET ORAL at 13:45

## 2020-03-06 RX ADMIN — CARBIDOPA AND LEVODOPA SCH TAB: 10; 100 TABLET ORAL at 08:58

## 2020-03-06 RX ADMIN — Medication SCH MG: at 08:54

## 2020-03-06 RX ADMIN — IPRATROPIUM BROMIDE SCH MG: 0.5 SOLUTION RESPIRATORY (INHALATION) at 13:34

## 2020-03-06 RX ADMIN — DOCUSATE SODIUM SCH MG: 100 CAPSULE, LIQUID FILLED ORAL at 08:54

## 2020-03-07 VITALS — SYSTOLIC BLOOD PRESSURE: 117 MMHG

## 2020-03-07 VITALS — SYSTOLIC BLOOD PRESSURE: 99 MMHG

## 2020-03-07 VITALS — SYSTOLIC BLOOD PRESSURE: 121 MMHG

## 2020-03-07 LAB
ANION GAP SERPL CALCULATED.3IONS-SCNC: < 3 MMOL/L (ref 5–15)
BASOPHILS # BLD AUTO: 0 K/UL (ref 0–0.2)
BASOPHILS NFR BLD AUTO: 0.1 % (ref 0–2)
BUN SERPL-MCNC: 21 MG/DL (ref 8–21)
CALCIUM SERPL-MCNC: 9 MG/DL (ref 8.4–11)
CHLORIDE SERPL-SCNC: 95 MMOL/L (ref 98–107)
CREAT SERPL-MCNC: 0.68 MG/DL (ref 0.55–1.3)
EOSINOPHIL # BLD AUTO: 0.3 K/UL (ref 0–0.4)
EOSINOPHIL NFR BLD AUTO: 3.5 % (ref 0–4)
ERYTHROCYTE [DISTWIDTH] IN BLOOD BY AUTOMATED COUNT: 14.4 % (ref 9–15)
GFR SERPL CREATININE-BSD FRML MDRD: (no result) ML/MIN (ref 90–?)
GLUCOSE SERPL-MCNC: 79 MG/DL (ref 70–99)
HCT VFR BLD AUTO: 38.2 % (ref 36–48)
HGB BLD-MCNC: 12.6 G/DL (ref 12–16)
LYMPHOCYTES # BLD AUTO: 1 K/UL (ref 1–5.5)
LYMPHOCYTES NFR BLD AUTO: 11.4 % (ref 20.5–51.5)
MCH RBC QN AUTO: 30 PG (ref 27–31)
MCHC RBC AUTO-ENTMCNC: 33 % (ref 32–36)
MCV RBC AUTO: 90 FL (ref 79–98)
MONOCYTES # BLD MANUAL: 0.7 K/UL (ref 0–1)
MONOCYTES # BLD MANUAL: 8.8 % (ref 1.7–9.3)
NEUTROPHILS # BLD AUTO: 6.5 K/UL (ref 1.8–7.7)
NEUTROPHILS NFR BLD AUTO: 76.2 % (ref 40–70)
PLATELET # BLD AUTO: 319 K/UL (ref 130–430)
POTASSIUM SERPL-SCNC: 4.1 MMOL/L (ref 3.5–5.1)
RBC # BLD AUTO: 4.27 MIL/UL (ref 4.2–6.2)
SODIUM SERPLBLD-SCNC: 130 MMOL/L (ref 136–145)
WBC # BLD AUTO: 8.5 K/UL (ref 4.8–10.8)

## 2020-03-07 RX ADMIN — DILTIAZEM HYDROCHLORIDE SCH MG: 60 TABLET, FILM COATED ORAL at 13:15

## 2020-03-07 RX ADMIN — IPRATROPIUM BROMIDE SCH MG: 0.5 SOLUTION RESPIRATORY (INHALATION) at 01:00

## 2020-03-07 RX ADMIN — APIXABAN SCH MG: 2.5 TABLET, FILM COATED ORAL at 21:45

## 2020-03-07 RX ADMIN — IPRATROPIUM BROMIDE SCH MG: 0.5 SOLUTION RESPIRATORY (INHALATION) at 07:25

## 2020-03-07 RX ADMIN — PREDNISONE SCH MG: 10 TABLET ORAL at 09:45

## 2020-03-07 RX ADMIN — ALBUTEROL SULFATE SCH MG: 2.5 SOLUTION RESPIRATORY (INHALATION) at 01:00

## 2020-03-07 RX ADMIN — ALBUTEROL SULFATE SCH MG: 2.5 SOLUTION RESPIRATORY (INHALATION) at 13:39

## 2020-03-07 RX ADMIN — DEXTROSE AND SODIUM CHLORIDE SCH MLS/HR: 5; 450 INJECTION, SOLUTION INTRAVENOUS at 22:41

## 2020-03-07 RX ADMIN — DOCUSATE SODIUM SCH MG: 100 CAPSULE, LIQUID FILLED ORAL at 21:40

## 2020-03-07 RX ADMIN — DEXTROSE MONOHYDRATE SCH MLS/HR: 50 INJECTION, SOLUTION INTRAVENOUS at 09:40

## 2020-03-07 RX ADMIN — Medication SCH MG: at 09:56

## 2020-03-07 RX ADMIN — DILTIAZEM HYDROCHLORIDE SCH MG: 60 TABLET, FILM COATED ORAL at 21:42

## 2020-03-07 RX ADMIN — Medication SCH MG: at 21:43

## 2020-03-07 RX ADMIN — IPRATROPIUM BROMIDE SCH MG: 0.5 SOLUTION RESPIRATORY (INHALATION) at 13:39

## 2020-03-07 RX ADMIN — DEXTROSE AND SODIUM CHLORIDE SCH MLS/HR: 5; 450 INJECTION, SOLUTION INTRAVENOUS at 08:45

## 2020-03-07 RX ADMIN — CARBIDOPA AND LEVODOPA SCH TAB: 10; 100 TABLET ORAL at 09:45

## 2020-03-07 RX ADMIN — ALBUTEROL SULFATE SCH MG: 2.5 SOLUTION RESPIRATORY (INHALATION) at 07:25

## 2020-03-07 RX ADMIN — APIXABAN SCH MG: 2.5 TABLET, FILM COATED ORAL at 09:44

## 2020-03-07 RX ADMIN — AMIODARONE HYDROCHLORIDE SCH MG: 200 TABLET ORAL at 09:45

## 2020-03-07 RX ADMIN — CARBIDOPA AND LEVODOPA SCH TAB: 10; 100 TABLET ORAL at 13:15

## 2020-03-07 RX ADMIN — ALBUTEROL SULFATE SCH MG: 2.5 SOLUTION RESPIRATORY (INHALATION) at 20:05

## 2020-03-07 RX ADMIN — IPRATROPIUM BROMIDE SCH MG: 0.5 SOLUTION RESPIRATORY (INHALATION) at 20:05

## 2020-03-07 RX ADMIN — CARBIDOPA AND LEVODOPA SCH TAB: 10; 100 TABLET ORAL at 16:12

## 2020-03-07 RX ADMIN — PANTOPRAZOLE SODIUM SCH MG: 40 TABLET, DELAYED RELEASE ORAL at 09:55

## 2020-03-07 RX ADMIN — CARBIDOPA AND LEVODOPA SCH TAB: 10; 100 TABLET ORAL at 21:40

## 2020-03-07 RX ADMIN — DILTIAZEM HYDROCHLORIDE SCH MG: 60 TABLET, FILM COATED ORAL at 05:43

## 2020-03-07 RX ADMIN — DOCUSATE SODIUM SCH MG: 100 CAPSULE, LIQUID FILLED ORAL at 09:48

## 2020-03-08 VITALS — SYSTOLIC BLOOD PRESSURE: 136 MMHG

## 2020-03-08 VITALS — SYSTOLIC BLOOD PRESSURE: 125 MMHG

## 2020-03-08 VITALS — SYSTOLIC BLOOD PRESSURE: 134 MMHG

## 2020-03-08 VITALS — SYSTOLIC BLOOD PRESSURE: 103 MMHG

## 2020-03-08 VITALS — SYSTOLIC BLOOD PRESSURE: 123 MMHG

## 2020-03-08 VITALS — SYSTOLIC BLOOD PRESSURE: 115 MMHG

## 2020-03-08 LAB
ANION GAP SERPL CALCULATED.3IONS-SCNC: 3 MMOL/L (ref 5–15)
BASOPHILS # BLD AUTO: 0 K/UL (ref 0–0.2)
BASOPHILS NFR BLD AUTO: 0.1 % (ref 0–2)
BUN SERPL-MCNC: 19 MG/DL (ref 8–21)
CALCIUM SERPL-MCNC: 8.5 MG/DL (ref 8.4–11)
CHLORIDE SERPL-SCNC: 100 MMOL/L (ref 98–107)
CREAT SERPL-MCNC: 0.68 MG/DL (ref 0.55–1.3)
EOSINOPHIL # BLD AUTO: 0.2 K/UL (ref 0–0.4)
EOSINOPHIL NFR BLD AUTO: 2.6 % (ref 0–4)
ERYTHROCYTE [DISTWIDTH] IN BLOOD BY AUTOMATED COUNT: 14.5 % (ref 9–15)
GFR SERPL CREATININE-BSD FRML MDRD: (no result) ML/MIN (ref 90–?)
GLUCOSE SERPL-MCNC: 81 MG/DL (ref 70–99)
HCT VFR BLD AUTO: 37.9 % (ref 36–48)
HGB BLD-MCNC: 12.4 G/DL (ref 12–16)
LYMPHOCYTES # BLD AUTO: 1.1 K/UL (ref 1–5.5)
LYMPHOCYTES NFR BLD AUTO: 11.3 % (ref 20.5–51.5)
MCH RBC QN AUTO: 29 PG (ref 27–31)
MCHC RBC AUTO-ENTMCNC: 33 % (ref 32–36)
MCV RBC AUTO: 89 FL (ref 79–98)
MONOCYTES # BLD MANUAL: 1 K/UL (ref 0–1)
MONOCYTES # BLD MANUAL: 9.9 % (ref 1.7–9.3)
NEUTROPHILS # BLD AUTO: 7.4 K/UL (ref 1.8–7.7)
NEUTROPHILS NFR BLD AUTO: 76.1 % (ref 40–70)
PLATELET # BLD AUTO: 352 K/UL (ref 130–430)
POTASSIUM SERPL-SCNC: 4.1 MMOL/L (ref 3.5–5.1)
RBC # BLD AUTO: 4.24 MIL/UL (ref 4.2–6.2)
SODIUM SERPLBLD-SCNC: 136 MMOL/L (ref 136–145)
WBC # BLD AUTO: 9.7 K/UL (ref 4.8–10.8)

## 2020-03-08 RX ADMIN — DILTIAZEM HYDROCHLORIDE SCH MG: 60 TABLET, FILM COATED ORAL at 22:35

## 2020-03-08 RX ADMIN — DOCUSATE SODIUM SCH MG: 100 CAPSULE, LIQUID FILLED ORAL at 22:33

## 2020-03-08 RX ADMIN — ALBUTEROL SULFATE SCH MG: 2.5 SOLUTION RESPIRATORY (INHALATION) at 13:26

## 2020-03-08 RX ADMIN — CARBIDOPA AND LEVODOPA SCH TAB: 10; 100 TABLET ORAL at 22:35

## 2020-03-08 RX ADMIN — ALBUTEROL SULFATE SCH MG: 2.5 SOLUTION RESPIRATORY (INHALATION) at 01:00

## 2020-03-08 RX ADMIN — APIXABAN SCH MG: 2.5 TABLET, FILM COATED ORAL at 22:37

## 2020-03-08 RX ADMIN — CARBIDOPA AND LEVODOPA SCH TAB: 10; 100 TABLET ORAL at 09:08

## 2020-03-08 RX ADMIN — ALBUTEROL SULFATE SCH MG: 2.5 SOLUTION RESPIRATORY (INHALATION) at 07:31

## 2020-03-08 RX ADMIN — Medication SCH MG: at 22:34

## 2020-03-08 RX ADMIN — PANTOPRAZOLE SODIUM SCH MG: 40 TABLET, DELAYED RELEASE ORAL at 09:08

## 2020-03-08 RX ADMIN — AMIODARONE HYDROCHLORIDE SCH MG: 200 TABLET ORAL at 09:06

## 2020-03-08 RX ADMIN — CARBIDOPA AND LEVODOPA SCH TAB: 10; 100 TABLET ORAL at 17:20

## 2020-03-08 RX ADMIN — CARBIDOPA AND LEVODOPA SCH TAB: 10; 100 TABLET ORAL at 12:55

## 2020-03-08 RX ADMIN — Medication SCH MG: at 09:08

## 2020-03-08 RX ADMIN — DILTIAZEM HYDROCHLORIDE SCH MG: 60 TABLET, FILM COATED ORAL at 06:15

## 2020-03-08 RX ADMIN — IPRATROPIUM BROMIDE SCH MG: 0.5 SOLUTION RESPIRATORY (INHALATION) at 07:31

## 2020-03-08 RX ADMIN — PREDNISONE SCH MG: 10 TABLET ORAL at 09:06

## 2020-03-08 RX ADMIN — IPRATROPIUM BROMIDE SCH MG: 0.5 SOLUTION RESPIRATORY (INHALATION) at 13:26

## 2020-03-08 RX ADMIN — DOCUSATE SODIUM SCH MG: 100 CAPSULE, LIQUID FILLED ORAL at 09:06

## 2020-03-08 RX ADMIN — APIXABAN SCH MG: 2.5 TABLET, FILM COATED ORAL at 09:07

## 2020-03-08 RX ADMIN — IPRATROPIUM BROMIDE SCH MG: 0.5 SOLUTION RESPIRATORY (INHALATION) at 01:00

## 2020-03-08 RX ADMIN — DILTIAZEM HYDROCHLORIDE SCH MG: 60 TABLET, FILM COATED ORAL at 12:56

## 2020-03-08 RX ADMIN — DEXTROSE MONOHYDRATE SCH MLS/HR: 50 INJECTION, SOLUTION INTRAVENOUS at 08:53

## 2020-03-08 RX ADMIN — ALBUTEROL SULFATE SCH MG: 2.5 SOLUTION RESPIRATORY (INHALATION) at 19:24

## 2020-03-08 RX ADMIN — IPRATROPIUM BROMIDE SCH MG: 0.5 SOLUTION RESPIRATORY (INHALATION) at 19:24

## 2020-03-09 VITALS — SYSTOLIC BLOOD PRESSURE: 134 MMHG

## 2020-03-09 VITALS — SYSTOLIC BLOOD PRESSURE: 115 MMHG

## 2020-03-09 VITALS — SYSTOLIC BLOOD PRESSURE: 117 MMHG

## 2020-03-09 VITALS — SYSTOLIC BLOOD PRESSURE: 137 MMHG

## 2020-03-09 VITALS — SYSTOLIC BLOOD PRESSURE: 124 MMHG

## 2020-03-09 RX ADMIN — DOCUSATE SODIUM SCH MG: 100 CAPSULE, LIQUID FILLED ORAL at 21:12

## 2020-03-09 RX ADMIN — DILTIAZEM HYDROCHLORIDE SCH MG: 60 TABLET, FILM COATED ORAL at 13:45

## 2020-03-09 RX ADMIN — IPRATROPIUM BROMIDE SCH MG: 0.5 SOLUTION RESPIRATORY (INHALATION) at 20:11

## 2020-03-09 RX ADMIN — Medication SCH MG: at 21:08

## 2020-03-09 RX ADMIN — Medication SCH MG: at 08:25

## 2020-03-09 RX ADMIN — PRAMIPEXOLE DIHYDROCHLORIDE SCH MG: 0.25 TABLET ORAL at 22:29

## 2020-03-09 RX ADMIN — DEXTROSE MONOHYDRATE SCH MLS/HR: 50 INJECTION, SOLUTION INTRAVENOUS at 08:23

## 2020-03-09 RX ADMIN — ALBUTEROL SULFATE SCH MG: 2.5 SOLUTION RESPIRATORY (INHALATION) at 13:09

## 2020-03-09 RX ADMIN — APIXABAN SCH MG: 2.5 TABLET, FILM COATED ORAL at 21:12

## 2020-03-09 RX ADMIN — APIXABAN SCH MG: 2.5 TABLET, FILM COATED ORAL at 08:26

## 2020-03-09 RX ADMIN — DILTIAZEM HYDROCHLORIDE SCH MG: 60 TABLET, FILM COATED ORAL at 22:31

## 2020-03-09 RX ADMIN — ACETAMINOPHEN PRN MG: 650 SOLUTION ORAL at 02:27

## 2020-03-09 RX ADMIN — IPRATROPIUM BROMIDE SCH MG: 0.5 SOLUTION RESPIRATORY (INHALATION) at 01:30

## 2020-03-09 RX ADMIN — IPRATROPIUM BROMIDE SCH MG: 0.5 SOLUTION RESPIRATORY (INHALATION) at 07:30

## 2020-03-09 RX ADMIN — CARBIDOPA AND LEVODOPA SCH TAB: 10; 100 TABLET ORAL at 17:14

## 2020-03-09 RX ADMIN — PANTOPRAZOLE SODIUM SCH MG: 40 TABLET, DELAYED RELEASE ORAL at 08:25

## 2020-03-09 RX ADMIN — DOCUSATE SODIUM SCH MG: 100 CAPSULE, LIQUID FILLED ORAL at 08:24

## 2020-03-09 RX ADMIN — CARBIDOPA AND LEVODOPA SCH TAB: 10; 100 TABLET ORAL at 08:25

## 2020-03-09 RX ADMIN — DILTIAZEM HYDROCHLORIDE SCH MG: 60 TABLET, FILM COATED ORAL at 06:52

## 2020-03-09 RX ADMIN — ALBUTEROL SULFATE SCH MG: 2.5 SOLUTION RESPIRATORY (INHALATION) at 20:11

## 2020-03-09 RX ADMIN — PREDNISONE SCH MG: 10 TABLET ORAL at 08:25

## 2020-03-09 RX ADMIN — IPRATROPIUM BROMIDE SCH MG: 0.5 SOLUTION RESPIRATORY (INHALATION) at 13:09

## 2020-03-09 RX ADMIN — ALBUTEROL SULFATE SCH MG: 2.5 SOLUTION RESPIRATORY (INHALATION) at 07:30

## 2020-03-09 RX ADMIN — AMIODARONE HYDROCHLORIDE SCH MG: 200 TABLET ORAL at 08:24

## 2020-03-09 RX ADMIN — PRAMIPEXOLE DIHYDROCHLORIDE SCH MG: 0.25 TABLET ORAL at 13:45

## 2020-03-09 RX ADMIN — CARBIDOPA AND LEVODOPA SCH TAB: 10; 100 TABLET ORAL at 21:07

## 2020-03-09 RX ADMIN — CARBIDOPA AND LEVODOPA SCH TAB: 10; 100 TABLET ORAL at 12:09

## 2020-03-09 RX ADMIN — ALBUTEROL SULFATE SCH MG: 2.5 SOLUTION RESPIRATORY (INHALATION) at 01:30

## 2020-03-10 VITALS — SYSTOLIC BLOOD PRESSURE: 139 MMHG

## 2020-03-10 VITALS — SYSTOLIC BLOOD PRESSURE: 128 MMHG

## 2020-03-10 VITALS — SYSTOLIC BLOOD PRESSURE: 113 MMHG

## 2020-03-10 VITALS — SYSTOLIC BLOOD PRESSURE: 118 MMHG

## 2020-03-10 RX ADMIN — APIXABAN SCH MG: 2.5 TABLET, FILM COATED ORAL at 08:16

## 2020-03-10 RX ADMIN — CARBIDOPA AND LEVODOPA SCH TAB: 10; 100 TABLET ORAL at 12:06

## 2020-03-10 RX ADMIN — DILTIAZEM HYDROCHLORIDE SCH MG: 60 TABLET, FILM COATED ORAL at 13:15

## 2020-03-10 RX ADMIN — DEXTROSE MONOHYDRATE SCH MLS/HR: 50 INJECTION, SOLUTION INTRAVENOUS at 07:44

## 2020-03-10 RX ADMIN — ALBUTEROL SULFATE SCH MG: 2.5 SOLUTION RESPIRATORY (INHALATION) at 07:45

## 2020-03-10 RX ADMIN — PRAMIPEXOLE DIHYDROCHLORIDE SCH MG: 0.25 TABLET ORAL at 06:41

## 2020-03-10 RX ADMIN — IPRATROPIUM BROMIDE SCH MG: 0.5 SOLUTION RESPIRATORY (INHALATION) at 01:00

## 2020-03-10 RX ADMIN — PANTOPRAZOLE SODIUM SCH MG: 40 TABLET, DELAYED RELEASE ORAL at 08:16

## 2020-03-10 RX ADMIN — AMIODARONE HYDROCHLORIDE SCH MG: 200 TABLET ORAL at 08:17

## 2020-03-10 RX ADMIN — IPRATROPIUM BROMIDE SCH MG: 0.5 SOLUTION RESPIRATORY (INHALATION) at 07:45

## 2020-03-10 RX ADMIN — CARBIDOPA AND LEVODOPA SCH TAB: 10; 100 TABLET ORAL at 16:13

## 2020-03-10 RX ADMIN — DOCUSATE SODIUM SCH MG: 100 CAPSULE, LIQUID FILLED ORAL at 08:16

## 2020-03-10 RX ADMIN — DILTIAZEM HYDROCHLORIDE SCH MG: 60 TABLET, FILM COATED ORAL at 06:42

## 2020-03-10 RX ADMIN — Medication SCH MG: at 08:17

## 2020-03-10 RX ADMIN — CARBIDOPA AND LEVODOPA SCH TAB: 10; 100 TABLET ORAL at 08:16

## 2020-03-10 RX ADMIN — IPRATROPIUM BROMIDE SCH MG: 0.5 SOLUTION RESPIRATORY (INHALATION) at 14:01

## 2020-03-10 RX ADMIN — ALBUTEROL SULFATE SCH MG: 2.5 SOLUTION RESPIRATORY (INHALATION) at 14:01

## 2020-03-10 RX ADMIN — PRAMIPEXOLE DIHYDROCHLORIDE SCH MG: 0.25 TABLET ORAL at 13:15

## 2020-03-10 RX ADMIN — ALBUTEROL SULFATE SCH MG: 2.5 SOLUTION RESPIRATORY (INHALATION) at 01:00

## 2020-03-10 RX ADMIN — PREDNISONE SCH MG: 10 TABLET ORAL at 08:16
